# Patient Record
Sex: FEMALE | Race: WHITE | Employment: PART TIME | URBAN - METROPOLITAN AREA
[De-identification: names, ages, dates, MRNs, and addresses within clinical notes are randomized per-mention and may not be internally consistent; named-entity substitution may affect disease eponyms.]

---

## 2017-01-20 ENCOUNTER — TELEPHONE (OUTPATIENT)
Dept: FAMILY MEDICINE CLINIC | Facility: CLINIC | Age: 22
End: 2017-01-20

## 2017-01-20 DIAGNOSIS — E10.9 TYPE 1 DIABETES MELLITUS WITHOUT COMPLICATION (HCC): Primary | ICD-10-CM

## 2017-02-07 ENCOUNTER — TELEPHONE (OUTPATIENT)
Dept: FAMILY MEDICINE CLINIC | Facility: CLINIC | Age: 22
End: 2017-02-07

## 2017-02-07 DIAGNOSIS — Z11.1 SCREENING-PULMONARY TB: Primary | ICD-10-CM

## 2017-02-07 NOTE — TELEPHONE ENCOUNTER
Patient states she needs a TB blood test for school. Has nurse visit scheduled Friday.  Patient aware office will call her only if further questions     Routed to Dr Charlie Little to order if ok

## 2017-02-12 ENCOUNTER — HOSPITAL ENCOUNTER (OUTPATIENT)
Age: 22
Discharge: HOME | End: 2017-02-12
Attending: EMERGENCY MEDICINE
Payer: COMMERCIAL

## 2017-02-12 VITALS
OXYGEN SATURATION: 97 % | SYSTOLIC BLOOD PRESSURE: 106 MMHG | HEART RATE: 66 BPM | BODY MASS INDEX: 27.46 KG/M2 | HEIGHT: 63 IN | TEMPERATURE: 98 F | RESPIRATION RATE: 18 BRPM | WEIGHT: 155 LBS | DIASTOLIC BLOOD PRESSURE: 73 MMHG

## 2017-02-12 DIAGNOSIS — J40 BRONCHITIS: Primary | ICD-10-CM

## 2017-02-12 PROCEDURE — 99204 OFFICE O/P NEW MOD 45 MIN: CPT

## 2017-02-12 PROCEDURE — 99213 OFFICE O/P EST LOW 20 MIN: CPT

## 2017-02-12 RX ORDER — BENZONATATE 200 MG/1
200 CAPSULE ORAL 3 TIMES DAILY PRN
Qty: 30 CAPSULE | Refills: 0 | Status: SHIPPED | OUTPATIENT
Start: 2017-02-12 | End: 2017-03-14

## 2017-02-12 RX ORDER — AZITHROMYCIN 250 MG/1
TABLET, FILM COATED ORAL
Qty: 1 PACKAGE | Refills: 0 | Status: SHIPPED | OUTPATIENT
Start: 2017-02-12 | End: 2017-02-17

## 2017-02-12 NOTE — ED PROVIDER NOTES
Patient presents with:  Cough/URI    HPI:     Kate Mckeon is a 20year old female who presents with chief complaint of cough, congestion. Stared 2 weeks ago. 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 \"  She was taking muc discussed with patient. See AVS for detailed discharge instructions.

## 2017-02-12 NOTE — ED INITIAL ASSESSMENT (HPI)
Pt with c/o uri sx. Pt states cough and congestion for past 2 weeks. Pt c/o sore throat, resolved today. Pt was taking mucinex d.   Denies fevers

## 2017-02-17 ENCOUNTER — NURSE ONLY (OUTPATIENT)
Dept: FAMILY MEDICINE CLINIC | Facility: CLINIC | Age: 22
End: 2017-02-17

## 2017-02-17 DIAGNOSIS — Z01.84 IMMUNITY STATUS TESTING: Primary | ICD-10-CM

## 2017-02-17 PROCEDURE — 36415 COLL VENOUS BLD VENIPUNCTURE: CPT | Performed by: FAMILY MEDICINE

## 2017-02-17 PROCEDURE — 86480 TB TEST CELL IMMUN MEASURE: CPT | Performed by: FAMILY MEDICINE

## 2017-02-21 LAB
AMB EXT HGBA1C: 9.8 %
M TB TUBERC IFN-G BLD QL: NEGATIVE
M TB TUBERC IFN-G/MITOGEN IGNF BLD: 0.17 IU/ML
M TB TUBERC IGNF/MITOGEN IGNF CONTROL: >10 IU/ML
MITOGEN IGNF BCKGRD COR BLD-ACNC: 0.02 IU/ML

## 2017-02-22 ENCOUNTER — MED REC SCAN ONLY (OUTPATIENT)
Dept: FAMILY MEDICINE CLINIC | Facility: CLINIC | Age: 22
End: 2017-02-22

## 2017-05-24 RX ORDER — VALACYCLOVIR HYDROCHLORIDE 1 G/1
TABLET, FILM COATED ORAL
Qty: 24 TABLET | Refills: 1 | OUTPATIENT
Start: 2017-05-24

## 2017-06-07 RX ORDER — VALACYCLOVIR HYDROCHLORIDE 1 G/1
TABLET, FILM COATED ORAL
Qty: 24 TABLET | Refills: 0 | Status: SHIPPED | OUTPATIENT
Start: 2017-06-07

## 2017-07-05 ENCOUNTER — TELEPHONE (OUTPATIENT)
Dept: FAMILY MEDICINE CLINIC | Facility: CLINIC | Age: 22
End: 2017-07-05

## 2017-07-05 NOTE — TELEPHONE ENCOUNTER
Left message for patient to call. Need to find out when she last had her eye exam.  Did she follow up with Dr. Vann after the Feb appt.

## 2017-07-06 NOTE — TELEPHONE ENCOUNTER
Patient states that she had her eye exam on 3 30 2017 at [facility]- she states that she has requested for the results to be sent x 2- she wrote down the fax number and will request for the information to be sent again.  Patient states that she has not followed up

## 2017-07-25 ENCOUNTER — TELEPHONE (OUTPATIENT)
Dept: FAMILY MEDICINE CLINIC | Facility: CLINIC | Age: 22
End: 2017-07-25

## 2017-07-25 DIAGNOSIS — E10.9 TYPE 1 DIABETES MELLITUS WITHOUT COMPLICATION (HCC): Primary | ICD-10-CM

## 2017-08-14 ENCOUNTER — PATIENT OUTREACH (OUTPATIENT)
Dept: FAMILY MEDICINE CLINIC | Facility: CLINIC | Age: 22
End: 2017-08-14

## 2017-08-14 NOTE — PROGRESS NOTES
Left message for patient to call office back. Office phone number provided. Patient due for repeat A1c and eye exam.  See telephone message 7/5/17, states had eye exam done at [facility] 3/30/17.

## 2017-10-26 ENCOUNTER — MED REC SCAN ONLY (OUTPATIENT)
Dept: FAMILY MEDICINE CLINIC | Facility: CLINIC | Age: 22
End: 2017-10-26

## 2017-11-30 ENCOUNTER — TELEPHONE (OUTPATIENT)
Dept: FAMILY MEDICINE CLINIC | Facility: CLINIC | Age: 22
End: 2017-11-30

## 2018-09-27 ENCOUNTER — PATIENT OUTREACH (OUTPATIENT)
Dept: FAMILY MEDICINE CLINIC | Facility: CLINIC | Age: 23
End: 2018-09-27

## 2020-12-04 DIAGNOSIS — Z01.84 ANTIBODY RESPONSE EXAMINATION: ICD-10-CM

## 2021-02-22 ENCOUNTER — OFFICE VISIT (OUTPATIENT)
Dept: FAMILY MEDICINE | Facility: CLINIC | Age: 26
End: 2021-02-22
Payer: COMMERCIAL

## 2021-02-22 VITALS
OXYGEN SATURATION: 96 % | TEMPERATURE: 98 F | WEIGHT: 167.75 LBS | DIASTOLIC BLOOD PRESSURE: 88 MMHG | SYSTOLIC BLOOD PRESSURE: 124 MMHG | BODY MASS INDEX: 28.64 KG/M2 | HEIGHT: 64 IN | HEART RATE: 84 BPM

## 2021-02-22 DIAGNOSIS — Z78.9 USES BIRTH CONTROL: ICD-10-CM

## 2021-02-22 DIAGNOSIS — E10.9: Primary | ICD-10-CM

## 2021-02-22 DIAGNOSIS — Z00.00 ANNUAL PHYSICAL EXAM: ICD-10-CM

## 2021-02-22 DIAGNOSIS — Z86.19 H/O COLD SORES: ICD-10-CM

## 2021-02-22 PROCEDURE — 3008F PR BODY MASS INDEX (BMI) DOCUMENTED: ICD-10-PCS | Mod: CPTII,S$GLB,, | Performed by: INTERNAL MEDICINE

## 2021-02-22 PROCEDURE — 99204 OFFICE O/P NEW MOD 45 MIN: CPT | Mod: S$GLB,,, | Performed by: INTERNAL MEDICINE

## 2021-02-22 PROCEDURE — 1126F AMNT PAIN NOTED NONE PRSNT: CPT | Mod: S$GLB,,, | Performed by: INTERNAL MEDICINE

## 2021-02-22 PROCEDURE — 1126F PR PAIN SEVERITY QUANTIFIED, NO PAIN PRESENT: ICD-10-PCS | Mod: S$GLB,,, | Performed by: INTERNAL MEDICINE

## 2021-02-22 PROCEDURE — 99999 PR PBB SHADOW E&M-EST. PATIENT-LVL IV: CPT | Mod: PBBFAC,,, | Performed by: INTERNAL MEDICINE

## 2021-02-22 PROCEDURE — 3008F BODY MASS INDEX DOCD: CPT | Mod: CPTII,S$GLB,, | Performed by: INTERNAL MEDICINE

## 2021-02-22 PROCEDURE — 99204 PR OFFICE/OUTPT VISIT, NEW, LEVL IV, 45-59 MIN: ICD-10-PCS | Mod: S$GLB,,, | Performed by: INTERNAL MEDICINE

## 2021-02-22 PROCEDURE — 99999 PR PBB SHADOW E&M-EST. PATIENT-LVL IV: ICD-10-PCS | Mod: PBBFAC,,, | Performed by: INTERNAL MEDICINE

## 2021-02-22 RX ORDER — INSULIN ASPART 100 [IU]/ML
INJECTION, SOLUTION INTRAVENOUS; SUBCUTANEOUS
Qty: 40 ML | Refills: 11 | Status: SHIPPED | OUTPATIENT
Start: 2021-02-22 | End: 2022-03-04 | Stop reason: SDUPTHER

## 2021-02-22 RX ORDER — NORGESTIMATE AND ETHINYL ESTRADIOL 7DAYSX3 28
KIT ORAL
Qty: 28 TABLET | Refills: 11 | Status: SHIPPED | OUTPATIENT
Start: 2021-02-22

## 2021-02-22 RX ORDER — INSULIN ASPART 100 [IU]/ML
100 INJECTION, SOLUTION INTRAVENOUS; SUBCUTANEOUS DAILY
COMMUNITY

## 2021-02-22 RX ORDER — VALACYCLOVIR HYDROCHLORIDE 1 G/1
1000 TABLET, FILM COATED ORAL 2 TIMES DAILY
COMMUNITY
End: 2021-02-22 | Stop reason: SDUPTHER

## 2021-02-22 RX ORDER — VALACYCLOVIR HYDROCHLORIDE 1 G/1
1000 TABLET, FILM COATED ORAL 2 TIMES DAILY
Qty: 60 TABLET | Refills: 1 | Status: SHIPPED | OUTPATIENT
Start: 2021-02-22 | End: 2021-12-16 | Stop reason: SDUPTHER

## 2021-04-17 ENCOUNTER — IMMUNIZATION (OUTPATIENT)
Dept: PRIMARY CARE CLINIC | Facility: CLINIC | Age: 26
End: 2021-04-17
Payer: COMMERCIAL

## 2021-04-17 DIAGNOSIS — Z23 NEED FOR VACCINATION: Primary | ICD-10-CM

## 2021-04-17 PROCEDURE — 91301 COVID-19, MRNA, LNP-S, PF, 100 MCG/0.5 ML DOSE VACCINE: ICD-10-PCS | Mod: S$GLB,,, | Performed by: INTERNAL MEDICINE

## 2021-04-17 PROCEDURE — 0011A COVID-19, MRNA, LNP-S, PF, 100 MCG/0.5 ML DOSE VACCINE: ICD-10-PCS | Mod: CV19,S$GLB,, | Performed by: INTERNAL MEDICINE

## 2021-04-17 PROCEDURE — 0011A COVID-19, MRNA, LNP-S, PF, 100 MCG/0.5 ML DOSE VACCINE: CPT | Mod: CV19,S$GLB,, | Performed by: INTERNAL MEDICINE

## 2021-04-17 PROCEDURE — 91301 COVID-19, MRNA, LNP-S, PF, 100 MCG/0.5 ML DOSE VACCINE: CPT | Mod: S$GLB,,, | Performed by: INTERNAL MEDICINE

## 2021-04-19 ENCOUNTER — TELEPHONE (OUTPATIENT)
Dept: FAMILY MEDICINE | Facility: CLINIC | Age: 26
End: 2021-04-19

## 2021-05-17 ENCOUNTER — IMMUNIZATION (OUTPATIENT)
Dept: INTERNAL MEDICINE | Facility: CLINIC | Age: 26
End: 2021-05-17
Payer: COMMERCIAL

## 2021-05-17 DIAGNOSIS — Z23 NEED FOR VACCINATION: Primary | ICD-10-CM

## 2021-05-17 PROCEDURE — 0012A COVID-19, MRNA, LNP-S, PF, 100 MCG/0.5 ML DOSE VACCINE: CPT | Mod: PBBFAC | Performed by: INTERNAL MEDICINE

## 2021-07-06 ENCOUNTER — PATIENT MESSAGE (OUTPATIENT)
Dept: ADMINISTRATIVE | Facility: HOSPITAL | Age: 26
End: 2021-07-06

## 2021-08-20 ENCOUNTER — PATIENT MESSAGE (OUTPATIENT)
Dept: FAMILY MEDICINE | Facility: CLINIC | Age: 26
End: 2021-08-20

## 2021-10-08 ENCOUNTER — OFFICE VISIT (OUTPATIENT)
Dept: OPTOMETRY | Facility: CLINIC | Age: 26
End: 2021-10-08
Payer: COMMERCIAL

## 2021-10-08 DIAGNOSIS — E10.9 TYPE 1 DIABETES MELLITUS WITHOUT RETINOPATHY: Primary | ICD-10-CM

## 2021-10-08 DIAGNOSIS — H52.203 MYOPIA OF BOTH EYES WITH ASTIGMATISM: ICD-10-CM

## 2021-10-08 DIAGNOSIS — Z46.0 FITTING AND ADJUSTMENT OF SPECTACLES AND CONTACT LENSES: Primary | ICD-10-CM

## 2021-10-08 DIAGNOSIS — H04.123 DRY EYE SYNDROME OF BOTH EYES: ICD-10-CM

## 2021-10-08 DIAGNOSIS — H18.823 CONTACT LENS OVERWEAR OF BOTH EYES: ICD-10-CM

## 2021-10-08 DIAGNOSIS — H52.13 MYOPIA OF BOTH EYES WITH ASTIGMATISM: ICD-10-CM

## 2021-10-08 PROCEDURE — 92015 PR REFRACTION: ICD-10-PCS | Mod: S$GLB,,, | Performed by: OPTOMETRIST

## 2021-10-08 PROCEDURE — 92310 PR CONTACT LENS FITTING (NO CHANGE): ICD-10-PCS | Mod: S$GLB,,, | Performed by: OPTOMETRIST

## 2021-10-08 PROCEDURE — 99999 PR PBB SHADOW E&M-EST. PATIENT-LVL I: CPT | Mod: PBBFAC,,, | Performed by: OPTOMETRIST

## 2021-10-08 PROCEDURE — 92004 PR EYE EXAM, NEW PATIENT,COMPREHESV: ICD-10-PCS | Mod: S$GLB,,, | Performed by: OPTOMETRIST

## 2021-10-08 PROCEDURE — 99999 PR PBB SHADOW E&M-EST. PATIENT-LVL I: ICD-10-PCS | Mod: PBBFAC,,, | Performed by: OPTOMETRIST

## 2021-10-08 PROCEDURE — 92310 CONTACT LENS FITTING OU: CPT | Mod: S$GLB,,, | Performed by: OPTOMETRIST

## 2021-10-08 PROCEDURE — 99999 PR PBB SHADOW E&M-EST. PATIENT-LVL II: CPT | Mod: PBBFAC,,, | Performed by: OPTOMETRIST

## 2021-10-08 PROCEDURE — 99999 PR PBB SHADOW E&M-EST. PATIENT-LVL II: ICD-10-PCS | Mod: PBBFAC,,, | Performed by: OPTOMETRIST

## 2021-10-08 PROCEDURE — 92004 COMPRE OPH EXAM NEW PT 1/>: CPT | Mod: S$GLB,,, | Performed by: OPTOMETRIST

## 2021-10-08 PROCEDURE — 92015 DETERMINE REFRACTIVE STATE: CPT | Mod: S$GLB,,, | Performed by: OPTOMETRIST

## 2021-10-08 RX ORDER — NEOMYCIN SULFATE, POLYMYXIN B SULFATE AND DEXAMETHASONE 3.5; 10000; 1 MG/ML; [USP'U]/ML; MG/ML
1 SUSPENSION/ DROPS OPHTHALMIC 4 TIMES DAILY
Qty: 5 ML | Refills: 0 | Status: SHIPPED | OUTPATIENT
Start: 2021-10-08 | End: 2021-10-28

## 2021-10-25 ENCOUNTER — PATIENT MESSAGE (OUTPATIENT)
Dept: ADMINISTRATIVE | Facility: HOSPITAL | Age: 26
End: 2021-10-25
Payer: COMMERCIAL

## 2021-10-29 ENCOUNTER — OFFICE VISIT (OUTPATIENT)
Dept: OPTOMETRY | Facility: CLINIC | Age: 26
End: 2021-10-29
Payer: COMMERCIAL

## 2021-10-29 DIAGNOSIS — H52.13 MYOPIA OF BOTH EYES WITH ASTIGMATISM: Primary | ICD-10-CM

## 2021-10-29 DIAGNOSIS — H52.203 MYOPIA OF BOTH EYES WITH ASTIGMATISM: Primary | ICD-10-CM

## 2021-10-29 PROCEDURE — 92499 PR CONTACT LENS F/U LEV 1: ICD-10-PCS | Mod: S$GLB,,, | Performed by: OPTOMETRIST

## 2021-10-29 PROCEDURE — 99999 PR PBB SHADOW E&M-EST. PATIENT-LVL II: ICD-10-PCS | Mod: PBBFAC,,, | Performed by: OPTOMETRIST

## 2021-10-29 PROCEDURE — 1160F PR REVIEW ALL MEDS BY PRESCRIBER/CLIN PHARMACIST DOCUMENTED: ICD-10-PCS | Mod: CPTII,S$GLB,, | Performed by: OPTOMETRIST

## 2021-10-29 PROCEDURE — 1159F PR MEDICATION LIST DOCUMENTED IN MEDICAL RECORD: ICD-10-PCS | Mod: CPTII,S$GLB,, | Performed by: OPTOMETRIST

## 2021-10-29 PROCEDURE — 92499 UNLISTED OPH SVC/PROCEDURE: CPT | Mod: S$GLB,,, | Performed by: OPTOMETRIST

## 2021-10-29 PROCEDURE — 99999 PR PBB SHADOW E&M-EST. PATIENT-LVL II: CPT | Mod: PBBFAC,,, | Performed by: OPTOMETRIST

## 2021-10-29 PROCEDURE — 1159F MED LIST DOCD IN RCRD: CPT | Mod: CPTII,S$GLB,, | Performed by: OPTOMETRIST

## 2021-10-29 PROCEDURE — 1160F RVW MEDS BY RX/DR IN RCRD: CPT | Mod: CPTII,S$GLB,, | Performed by: OPTOMETRIST

## 2021-12-16 DIAGNOSIS — Z86.19 H/O COLD SORES: ICD-10-CM

## 2021-12-16 DIAGNOSIS — Z00.00 ANNUAL PHYSICAL EXAM: ICD-10-CM

## 2021-12-16 RX ORDER — VALACYCLOVIR HYDROCHLORIDE 1 G/1
1000 TABLET, FILM COATED ORAL 2 TIMES DAILY
Qty: 60 TABLET | Refills: 1 | Status: ON HOLD | OUTPATIENT
Start: 2021-12-16 | End: 2022-02-08 | Stop reason: HOSPADM

## 2022-01-10 ENCOUNTER — PATIENT MESSAGE (OUTPATIENT)
Dept: ADMINISTRATIVE | Facility: HOSPITAL | Age: 27
End: 2022-01-10
Payer: COMMERCIAL

## 2022-01-20 DIAGNOSIS — E11.9 TYPE 2 DIABETES MELLITUS WITHOUT COMPLICATION: ICD-10-CM

## 2022-02-06 ENCOUNTER — HOSPITAL ENCOUNTER (INPATIENT)
Facility: HOSPITAL | Age: 27
LOS: 2 days | Discharge: HOME | DRG: 637 | End: 2022-02-08
Attending: EMERGENCY MEDICINE | Admitting: INTERNAL MEDICINE
Payer: COMMERCIAL

## 2022-02-06 DIAGNOSIS — R00.0 TACHYCARDIA: ICD-10-CM

## 2022-02-06 DIAGNOSIS — E10.10 DIABETIC KETOACIDOSIS WITHOUT COMA ASSOCIATED WITH TYPE 1 DIABETES MELLITUS: ICD-10-CM

## 2022-02-06 DIAGNOSIS — E11.10 DKA (DIABETIC KETOACIDOSIS): ICD-10-CM

## 2022-02-06 DIAGNOSIS — R73.9 HYPERGLYCEMIA: ICD-10-CM

## 2022-02-06 PROBLEM — J12.82 PNEUMONIA DUE TO COVID-19 VIRUS: Status: ACTIVE | Noted: 2022-02-06

## 2022-02-06 PROBLEM — E87.20 LACTIC ACIDOSIS: Status: ACTIVE | Noted: 2022-02-06

## 2022-02-06 PROBLEM — U07.1 COVID: Status: ACTIVE | Noted: 2022-02-06

## 2022-02-06 PROBLEM — N17.9 AKI (ACUTE KIDNEY INJURY): Status: ACTIVE | Noted: 2022-02-06

## 2022-02-06 LAB
ALBUMIN SERPL BCP-MCNC: 4.6 G/DL (ref 3.5–5.2)
ALLENS TEST: ABNORMAL
ALP SERPL-CCNC: 153 U/L (ref 55–135)
ALT SERPL W/O P-5'-P-CCNC: 19 U/L (ref 10–44)
AMPHET+METHAMPHET UR QL: NEGATIVE
ANION GAP SERPL CALC-SCNC: 17 MMOL/L (ref 8–16)
ANION GAP SERPL CALC-SCNC: 25 MMOL/L (ref 8–16)
ANION GAP SERPL CALC-SCNC: ABNORMAL MMOL/L (ref 8–16)
AST SERPL-CCNC: 21 U/L (ref 10–40)
B-HCG UR QL: NEGATIVE
B-OH-BUTYR BLD STRIP-SCNC: 4.8 MMOL/L (ref 0–0.5)
BACTERIA #/AREA URNS AUTO: ABNORMAL /HPF
BARBITURATES UR QL SCN>200 NG/ML: NEGATIVE
BASOPHILS # BLD AUTO: 0.32 K/UL (ref 0–0.2)
BASOPHILS NFR BLD: 0.8 % (ref 0–1.9)
BENZODIAZ UR QL SCN>200 NG/ML: NEGATIVE
BILIRUB SERPL-MCNC: 1.1 MG/DL (ref 0.1–1)
BILIRUB UR QL STRIP: NEGATIVE
BUN SERPL-MCNC: 19 MG/DL (ref 6–20)
BUN SERPL-MCNC: 28 MG/DL (ref 6–20)
BUN SERPL-MCNC: 29 MG/DL (ref 6–20)
BZE UR QL SCN: NEGATIVE
CALCIUM SERPL-MCNC: 10 MG/DL (ref 8.7–10.5)
CALCIUM SERPL-MCNC: 9.7 MG/DL (ref 8.7–10.5)
CALCIUM SERPL-MCNC: 9.8 MG/DL (ref 8.7–10.5)
CANNABINOIDS UR QL SCN: NEGATIVE
CHLORIDE SERPL-SCNC: 104 MMOL/L (ref 95–110)
CHLORIDE SERPL-SCNC: 93 MMOL/L (ref 95–110)
CHLORIDE SERPL-SCNC: 98 MMOL/L (ref 95–110)
CLARITY UR REFRACT.AUTO: ABNORMAL
CO2 SERPL-SCNC: 13 MMOL/L (ref 23–29)
CO2 SERPL-SCNC: 5 MMOL/L (ref 23–29)
CO2 SERPL-SCNC: <5 MMOL/L (ref 23–29)
COLOR UR AUTO: YELLOW
CREAT SERPL-MCNC: 1.3 MG/DL (ref 0.5–1.4)
CREAT SERPL-MCNC: 1.9 MG/DL (ref 0.5–1.4)
CREAT SERPL-MCNC: 2.1 MG/DL (ref 0.5–1.4)
CREAT UR-MCNC: 27 MG/DL (ref 15–325)
CRP SERPL-MCNC: 6.4 MG/L (ref 0–8.2)
CTP QC/QA: YES
CTP QC/QA: YES
DELSYS: ABNORMAL
DELSYS: ABNORMAL
DIFFERENTIAL METHOD: ABNORMAL
EOSINOPHIL # BLD AUTO: 0 K/UL (ref 0–0.5)
EOSINOPHIL NFR BLD: 0.1 % (ref 0–8)
ERYTHROCYTE [DISTWIDTH] IN BLOOD BY AUTOMATED COUNT: 14 % (ref 11.5–14.5)
EST. GFR  (AFRICAN AMERICAN): 36.1 ML/MIN/1.73 M^2
EST. GFR  (AFRICAN AMERICAN): 40.8 ML/MIN/1.73 M^2
EST. GFR  (AFRICAN AMERICAN): >60 ML/MIN/1.73 M^2
EST. GFR  (NON AFRICAN AMERICAN): 31.3 ML/MIN/1.73 M^2
EST. GFR  (NON AFRICAN AMERICAN): 35.4 ML/MIN/1.73 M^2
EST. GFR  (NON AFRICAN AMERICAN): 56 ML/MIN/1.73 M^2
ESTIMATED AVG GLUCOSE: 298 MG/DL (ref 68–131)
ETHANOL UR-MCNC: <10 MG/DL
FERRITIN SERPL-MCNC: 102 NG/ML (ref 20–300)
GLUCOSE SERPL-MCNC: 172 MG/DL (ref 70–110)
GLUCOSE SERPL-MCNC: 524 MG/DL (ref 70–110)
GLUCOSE SERPL-MCNC: 873 MG/DL (ref 70–110)
GLUCOSE UR QL STRIP: ABNORMAL
GRAN CASTS UR QL COMP ASSIST: 3 /LPF
HBA1C MFR BLD: 12 % (ref 4–5.6)
HCO3 UR-SCNC: 18.7 MMOL/L (ref 24–28)
HCO3 UR-SCNC: 4.6 MMOL/L (ref 24–28)
HCT VFR BLD AUTO: 52.4 % (ref 37–48.5)
HCT VFR BLD CALC: 30 %PCV (ref 36–54)
HCT VFR BLD CALC: 49 %PCV (ref 36–54)
HGB BLD-MCNC: 15.8 G/DL (ref 12–16)
HGB UR QL STRIP: ABNORMAL
HYALINE CASTS UR QL AUTO: 4 /LPF
IMM GRANULOCYTES # BLD AUTO: 1.38 K/UL (ref 0–0.04)
IMM GRANULOCYTES NFR BLD AUTO: 3.5 % (ref 0–0.5)
KETONES UR QL STRIP: ABNORMAL
LACTATE SERPL-SCNC: 5.4 MMOL/L (ref 0.5–2.2)
LDH SERPL L TO P-CCNC: 361 U/L (ref 110–260)
LDH SERPL L TO P-CCNC: 6.08 MMOL/L (ref 0.5–2.2)
LEUKOCYTE ESTERASE UR QL STRIP: NEGATIVE
LIPASE SERPL-CCNC: 32 U/L (ref 4–60)
LYMPHOCYTES # BLD AUTO: 2 K/UL (ref 1–4.8)
LYMPHOCYTES NFR BLD: 5 % (ref 18–48)
MCH RBC QN AUTO: 28.3 PG (ref 27–31)
MCHC RBC AUTO-ENTMCNC: 30.2 G/DL (ref 32–36)
MCV RBC AUTO: 94 FL (ref 82–98)
METHADONE UR QL SCN>300 NG/ML: NEGATIVE
MICROSCOPIC COMMENT: ABNORMAL
MODE: ABNORMAL
MONOCYTES # BLD AUTO: 2.2 K/UL (ref 0.3–1)
MONOCYTES NFR BLD: 5.5 % (ref 4–15)
NEUTROPHILS # BLD AUTO: 33.9 K/UL (ref 1.8–7.7)
NEUTROPHILS NFR BLD: 85.1 % (ref 38–73)
NITRITE UR QL STRIP: NEGATIVE
NRBC BLD-RTO: 0 /100 WBC
OPIATES UR QL SCN: ABNORMAL
PCO2 BLDA: 22 MMHG (ref 35–45)
PCO2 BLDA: 36.6 MMHG (ref 35–45)
PCP UR QL SCN>25 NG/ML: NEGATIVE
PH SMN: 6.93 [PH] (ref 7.35–7.45)
PH SMN: 7.32 [PH] (ref 7.35–7.45)
PH UR STRIP: 6 [PH] (ref 5–8)
PLATELET # BLD AUTO: 533 K/UL (ref 150–450)
PMV BLD AUTO: 12.8 FL (ref 9.2–12.9)
PO2 BLDA: 132 MMHG (ref 40–60)
PO2 BLDA: 57 MMHG (ref 40–60)
POC BE: -28 MMOL/L
POC BE: -7 MMOL/L
POC IONIZED CALCIUM: 1.17 MMOL/L (ref 1.06–1.42)
POC IONIZED CALCIUM: 1.26 MMOL/L (ref 1.06–1.42)
POC SATURATED O2: 69 % (ref 95–100)
POC SATURATED O2: 99 % (ref 95–100)
POC TCO2: 20 MMOL/L (ref 24–29)
POC TCO2: 5 MMOL/L (ref 24–29)
POCT GLUCOSE: 111 MG/DL (ref 70–110)
POCT GLUCOSE: 138 MG/DL (ref 70–110)
POCT GLUCOSE: 151 MG/DL (ref 70–110)
POCT GLUCOSE: 199 MG/DL (ref 70–110)
POCT GLUCOSE: 211 MG/DL (ref 70–110)
POCT GLUCOSE: 227 MG/DL (ref 70–110)
POCT GLUCOSE: 255 MG/DL (ref 70–110)
POCT GLUCOSE: 263 MG/DL (ref 70–110)
POCT GLUCOSE: 311 MG/DL (ref 70–110)
POCT GLUCOSE: 314 MG/DL (ref 70–110)
POCT GLUCOSE: >500 MG/DL (ref 70–110)
POTASSIUM BLD-SCNC: 5.2 MMOL/L (ref 3.5–5.1)
POTASSIUM BLD-SCNC: 7.3 MMOL/L (ref 3.5–5.1)
POTASSIUM SERPL-SCNC: 4.2 MMOL/L (ref 3.5–5.1)
POTASSIUM SERPL-SCNC: 5 MMOL/L (ref 3.5–5.1)
POTASSIUM SERPL-SCNC: 7.2 MMOL/L (ref 3.5–5.1)
PROCALCITONIN SERPL IA-MCNC: 2.57 NG/ML
PROT SERPL-MCNC: 8.7 G/DL (ref 6–8.4)
PROT UR QL STRIP: ABNORMAL
RBC # BLD AUTO: 5.59 M/UL (ref 4–5.4)
RBC #/AREA URNS AUTO: 1 /HPF (ref 0–4)
SAMPLE: ABNORMAL
SARS-COV-2 RDRP RESP QL NAA+PROBE: POSITIVE
SITE: ABNORMAL
SODIUM BLD-SCNC: 125 MMOL/L (ref 136–145)
SODIUM BLD-SCNC: 133 MMOL/L (ref 136–145)
SODIUM SERPL-SCNC: 127 MMOL/L (ref 136–145)
SODIUM SERPL-SCNC: 128 MMOL/L (ref 136–145)
SODIUM SERPL-SCNC: 134 MMOL/L (ref 136–145)
SP GR UR STRIP: 1.02 (ref 1–1.03)
SQUAMOUS #/AREA URNS AUTO: 1 /HPF
TOXICOLOGY INFORMATION: ABNORMAL
TROPONIN I SERPL DL<=0.01 NG/ML-MCNC: 0.01 NG/ML (ref 0–0.03)
URN SPEC COLLECT METH UR: ABNORMAL
WBC # BLD AUTO: 39.84 K/UL (ref 3.9–12.7)
WBC #/AREA URNS AUTO: 4 /HPF (ref 0–5)
YEAST UR QL AUTO: ABNORMAL

## 2022-02-06 PROCEDURE — 63600175 PHARM REV CODE 636 W HCPCS: Performed by: EMERGENCY MEDICINE

## 2022-02-06 PROCEDURE — 85014 HEMATOCRIT: CPT

## 2022-02-06 PROCEDURE — 99291 CRITICAL CARE FIRST HOUR: CPT | Mod: 25

## 2022-02-06 PROCEDURE — 83605 ASSAY OF LACTIC ACID: CPT | Performed by: EMERGENCY MEDICINE

## 2022-02-06 PROCEDURE — 25000003 PHARM REV CODE 250: Performed by: INTERNAL MEDICINE

## 2022-02-06 PROCEDURE — 93005 ELECTROCARDIOGRAM TRACING: CPT

## 2022-02-06 PROCEDURE — 83690 ASSAY OF LIPASE: CPT | Performed by: EMERGENCY MEDICINE

## 2022-02-06 PROCEDURE — U0002 COVID-19 LAB TEST NON-CDC: HCPCS | Performed by: EMERGENCY MEDICINE

## 2022-02-06 PROCEDURE — 85025 COMPLETE CBC W/AUTO DIFF WBC: CPT | Performed by: EMERGENCY MEDICINE

## 2022-02-06 PROCEDURE — S5010 5% DEXTROSE AND 0.45% SALINE: HCPCS

## 2022-02-06 PROCEDURE — 93010 EKG 12-LEAD: ICD-10-PCS | Mod: ,,, | Performed by: INTERNAL MEDICINE

## 2022-02-06 PROCEDURE — 82330 ASSAY OF CALCIUM: CPT

## 2022-02-06 PROCEDURE — 80053 COMPREHEN METABOLIC PANEL: CPT | Performed by: EMERGENCY MEDICINE

## 2022-02-06 PROCEDURE — 36415 COLL VENOUS BLD VENIPUNCTURE: CPT | Performed by: NURSE PRACTITIONER

## 2022-02-06 PROCEDURE — 99291 PR CRITICAL CARE, E/M 30-74 MINUTES: ICD-10-PCS | Mod: ,,, | Performed by: EMERGENCY MEDICINE

## 2022-02-06 PROCEDURE — 99900035 HC TECH TIME PER 15 MIN (STAT)

## 2022-02-06 PROCEDURE — 81001 URINALYSIS AUTO W/SCOPE: CPT | Performed by: EMERGENCY MEDICINE

## 2022-02-06 PROCEDURE — 80048 BASIC METABOLIC PNL TOTAL CA: CPT | Performed by: NURSE PRACTITIONER

## 2022-02-06 PROCEDURE — 80047 BASIC METABLC PNL IONIZED CA: CPT | Mod: 91

## 2022-02-06 PROCEDURE — 99291 PR CRITICAL CARE, E/M 30-74 MINUTES: ICD-10-PCS | Mod: ,,, | Performed by: INTERNAL MEDICINE

## 2022-02-06 PROCEDURE — 87040 BLOOD CULTURE FOR BACTERIA: CPT | Performed by: EMERGENCY MEDICINE

## 2022-02-06 PROCEDURE — 84145 PROCALCITONIN (PCT): CPT | Performed by: STUDENT IN AN ORGANIZED HEALTH CARE EDUCATION/TRAINING PROGRAM

## 2022-02-06 PROCEDURE — 80307 DRUG TEST PRSMV CHEM ANLYZR: CPT | Performed by: STUDENT IN AN ORGANIZED HEALTH CARE EDUCATION/TRAINING PROGRAM

## 2022-02-06 PROCEDURE — 83036 HEMOGLOBIN GLYCOSYLATED A1C: CPT | Performed by: STUDENT IN AN ORGANIZED HEALTH CARE EDUCATION/TRAINING PROGRAM

## 2022-02-06 PROCEDURE — 82565 ASSAY OF CREATININE: CPT

## 2022-02-06 PROCEDURE — 63600175 PHARM REV CODE 636 W HCPCS: Performed by: STUDENT IN AN ORGANIZED HEALTH CARE EDUCATION/TRAINING PROGRAM

## 2022-02-06 PROCEDURE — 63600175 PHARM REV CODE 636 W HCPCS: Performed by: NURSE PRACTITIONER

## 2022-02-06 PROCEDURE — 82803 BLOOD GASES ANY COMBINATION: CPT

## 2022-02-06 PROCEDURE — 27000207 HC ISOLATION

## 2022-02-06 PROCEDURE — 81025 URINE PREGNANCY TEST: CPT | Performed by: EMERGENCY MEDICINE

## 2022-02-06 PROCEDURE — 84132 ASSAY OF SERUM POTASSIUM: CPT

## 2022-02-06 PROCEDURE — 20000000 HC ICU ROOM

## 2022-02-06 PROCEDURE — 86140 C-REACTIVE PROTEIN: CPT | Performed by: STUDENT IN AN ORGANIZED HEALTH CARE EDUCATION/TRAINING PROGRAM

## 2022-02-06 PROCEDURE — 83605 ASSAY OF LACTIC ACID: CPT

## 2022-02-06 PROCEDURE — 63600175 PHARM REV CODE 636 W HCPCS: Performed by: INTERNAL MEDICINE

## 2022-02-06 PROCEDURE — 25000003 PHARM REV CODE 250: Performed by: NURSE PRACTITIONER

## 2022-02-06 PROCEDURE — 80048 BASIC METABOLIC PNL TOTAL CA: CPT | Mod: 91 | Performed by: INTERNAL MEDICINE

## 2022-02-06 PROCEDURE — 96361 HYDRATE IV INFUSION ADD-ON: CPT

## 2022-02-06 PROCEDURE — 96365 THER/PROPH/DIAG IV INF INIT: CPT

## 2022-02-06 PROCEDURE — 93010 ELECTROCARDIOGRAM REPORT: CPT | Mod: ,,, | Performed by: INTERNAL MEDICINE

## 2022-02-06 PROCEDURE — 84295 ASSAY OF SERUM SODIUM: CPT

## 2022-02-06 PROCEDURE — 99223 PR INITIAL HOSPITAL CARE,LEVL III: ICD-10-PCS | Mod: ,,, | Performed by: GENERAL ACUTE CARE HOSPITAL

## 2022-02-06 PROCEDURE — 99223 1ST HOSP IP/OBS HIGH 75: CPT | Mod: ,,, | Performed by: GENERAL ACUTE CARE HOSPITAL

## 2022-02-06 PROCEDURE — 84484 ASSAY OF TROPONIN QUANT: CPT | Performed by: STUDENT IN AN ORGANIZED HEALTH CARE EDUCATION/TRAINING PROGRAM

## 2022-02-06 PROCEDURE — 99291 CRITICAL CARE FIRST HOUR: CPT | Mod: ,,, | Performed by: INTERNAL MEDICINE

## 2022-02-06 PROCEDURE — 94761 N-INVAS EAR/PLS OXIMETRY MLT: CPT

## 2022-02-06 PROCEDURE — 99291 CRITICAL CARE FIRST HOUR: CPT | Mod: ,,, | Performed by: EMERGENCY MEDICINE

## 2022-02-06 PROCEDURE — 83615 LACTATE (LD) (LDH) ENZYME: CPT | Performed by: STUDENT IN AN ORGANIZED HEALTH CARE EDUCATION/TRAINING PROGRAM

## 2022-02-06 PROCEDURE — 96375 TX/PRO/DX INJ NEW DRUG ADDON: CPT

## 2022-02-06 PROCEDURE — 82010 KETONE BODYS QUAN: CPT | Performed by: EMERGENCY MEDICINE

## 2022-02-06 PROCEDURE — 25000003 PHARM REV CODE 250: Performed by: EMERGENCY MEDICINE

## 2022-02-06 PROCEDURE — 25000003 PHARM REV CODE 250

## 2022-02-06 PROCEDURE — 82728 ASSAY OF FERRITIN: CPT | Performed by: STUDENT IN AN ORGANIZED HEALTH CARE EDUCATION/TRAINING PROGRAM

## 2022-02-06 RX ORDER — ONDANSETRON 2 MG/ML
4 INJECTION INTRAMUSCULAR; INTRAVENOUS
Status: COMPLETED | OUTPATIENT
Start: 2022-02-06 | End: 2022-02-06

## 2022-02-06 RX ORDER — DEXTROSE MONOHYDRATE 100 MG/ML
INJECTION, SOLUTION INTRAVENOUS
Status: DISCONTINUED | OUTPATIENT
Start: 2022-02-06 | End: 2022-02-08 | Stop reason: HOSPADM

## 2022-02-06 RX ORDER — MORPHINE SULFATE 4 MG/ML
4 INJECTION, SOLUTION INTRAMUSCULAR; INTRAVENOUS
Status: COMPLETED | OUTPATIENT
Start: 2022-02-06 | End: 2022-02-06

## 2022-02-06 RX ORDER — SODIUM CHLORIDE 0.9 % (FLUSH) 0.9 %
10 SYRINGE (ML) INJECTION
Status: DISCONTINUED | OUTPATIENT
Start: 2022-02-06 | End: 2022-02-08

## 2022-02-06 RX ORDER — SODIUM CHLORIDE, SODIUM LACTATE, POTASSIUM CHLORIDE, CALCIUM CHLORIDE 600; 310; 30; 20 MG/100ML; MG/100ML; MG/100ML; MG/100ML
INJECTION, SOLUTION INTRAVENOUS CONTINUOUS
Status: DISCONTINUED | OUTPATIENT
Start: 2022-02-06 | End: 2022-02-06

## 2022-02-06 RX ORDER — SODIUM CHLORIDE 0.9 % (FLUSH) 0.9 %
10 SYRINGE (ML) INJECTION
Status: DISCONTINUED | OUTPATIENT
Start: 2022-02-06 | End: 2022-02-08 | Stop reason: HOSPADM

## 2022-02-06 RX ORDER — ONDANSETRON 2 MG/ML
4 INJECTION INTRAMUSCULAR; INTRAVENOUS EVERY 8 HOURS PRN
Status: DISCONTINUED | OUTPATIENT
Start: 2022-02-06 | End: 2022-02-08 | Stop reason: HOSPADM

## 2022-02-06 RX ORDER — LIDOCAINE 50 MG/G
1 PATCH TOPICAL
Status: DISCONTINUED | OUTPATIENT
Start: 2022-02-06 | End: 2022-02-08 | Stop reason: HOSPADM

## 2022-02-06 RX ORDER — HEPARIN SODIUM 5000 [USP'U]/ML
5000 INJECTION, SOLUTION INTRAVENOUS; SUBCUTANEOUS EVERY 8 HOURS
Status: DISCONTINUED | OUTPATIENT
Start: 2022-02-06 | End: 2022-02-07

## 2022-02-06 RX ORDER — SODIUM CHLORIDE 9 MG/ML
INJECTION, SOLUTION INTRAVENOUS CONTINUOUS
Status: DISCONTINUED | OUTPATIENT
Start: 2022-02-06 | End: 2022-02-06

## 2022-02-06 RX ORDER — DEXTROSE MONOHYDRATE AND SODIUM CHLORIDE 5; .45 G/100ML; G/100ML
INJECTION, SOLUTION INTRAVENOUS CONTINUOUS
Status: DISCONTINUED | OUTPATIENT
Start: 2022-02-06 | End: 2022-02-07

## 2022-02-06 RX ORDER — ACETAMINOPHEN 325 MG/1
650 TABLET ORAL EVERY 4 HOURS PRN
Status: DISCONTINUED | OUTPATIENT
Start: 2022-02-06 | End: 2022-02-08 | Stop reason: HOSPADM

## 2022-02-06 RX ADMIN — SODIUM CHLORIDE, SODIUM LACTATE, POTASSIUM CHLORIDE, AND CALCIUM CHLORIDE: .6; .31; .03; .02 INJECTION, SOLUTION INTRAVENOUS at 07:02

## 2022-02-06 RX ADMIN — ONDANSETRON 4 MG: 2 INJECTION INTRAMUSCULAR; INTRAVENOUS at 05:02

## 2022-02-06 RX ADMIN — ACETAMINOPHEN 650 MG: 325 TABLET ORAL at 04:02

## 2022-02-06 RX ADMIN — DEXTROSE MONOHYDRATE AND SODIUM CHLORIDE: 5; .45 INJECTION, SOLUTION INTRAVENOUS at 05:02

## 2022-02-06 RX ADMIN — INSULIN HUMAN 3 UNITS/HR: 1 INJECTION, SOLUTION INTRAVENOUS at 05:02

## 2022-02-06 RX ADMIN — INSULIN HUMAN 6 UNITS: 100 INJECTION, SOLUTION PARENTERAL at 05:02

## 2022-02-06 RX ADMIN — CALCIUM GLUCONATE 1 G: 98 INJECTION, SOLUTION INTRAVENOUS at 05:02

## 2022-02-06 RX ADMIN — PIPERACILLIN AND TAZOBACTAM 4.5 G: 4; .5 INJECTION, POWDER, LYOPHILIZED, FOR SOLUTION INTRAVENOUS; PARENTERAL at 01:02

## 2022-02-06 RX ADMIN — HEPARIN SODIUM 5000 UNITS: 5000 INJECTION INTRAVENOUS; SUBCUTANEOUS at 10:02

## 2022-02-06 RX ADMIN — ACETAMINOPHEN 650 MG: 325 TABLET ORAL at 10:02

## 2022-02-06 RX ADMIN — SODIUM CHLORIDE 1000 ML: 0.9 INJECTION, SOLUTION INTRAVENOUS at 03:02

## 2022-02-06 RX ADMIN — PIPERACILLIN AND TAZOBACTAM 4.5 G: 4; .5 INJECTION, POWDER, LYOPHILIZED, FOR SOLUTION INTRAVENOUS; PARENTERAL at 05:02

## 2022-02-06 RX ADMIN — VANCOMYCIN HYDROCHLORIDE 1250 MG: 1.25 INJECTION, POWDER, LYOPHILIZED, FOR SOLUTION INTRAVENOUS at 09:02

## 2022-02-06 RX ADMIN — MORPHINE SULFATE 4 MG: 4 INJECTION INTRAVENOUS at 04:02

## 2022-02-06 RX ADMIN — ONDANSETRON 4 MG: 2 INJECTION INTRAMUSCULAR; INTRAVENOUS at 03:02

## 2022-02-06 RX ADMIN — HEPARIN SODIUM 5000 UNITS: 5000 INJECTION INTRAVENOUS; SUBCUTANEOUS at 02:02

## 2022-02-06 NOTE — LETTER
February 8, 2022    Debra Duran  [address]  [place] [address]                [address]  [facility] [address]  Phone: [phone]  Fax: [phone] To whom it may concern:    This is to certify that Ms. Debra Duran is suitable to return to work on 2/12.    If you have any questions or concerns, please don't hesitate to call.    Sincerely,        Judith Parkinson MD

## 2022-02-06 NOTE — H&P
"[facility] Dept  Critical Care Medicine  History & Physical    Patient Name: Debra Duran  MRN: [id number]  Admission Date: 2/6/2022  Hospital Length of Stay: 0 days  Code Status: Full Code  Attending Physician: Emelia Newsome MD   Primary Care Provider: Melvi Savage MD   Principal Problem: DKA (diabetic ketoacidosis)    Subjective:     HPI:  Mr. Duran is 28 y/o with PMHx IDDMI diagnosed at age of 13 years on insuline pump Ha1c around 8. Who presented to ED for one day of nausea and vomiting. She reports started feeling nauseous yesterday afternoon when she was at work and felt weak. She went home has been feel nauseous and has been drinking Sprit and water. She have been vomiting and stared having b/l subcostal abdominal pain and flank pain. She reports feeling SOB and she thought she it is from " volume overload" because she has been drinking a lot of fluid to keep herself hydrated, however she never had any heart failure issues before. She fever, chills, cough, chest pain, diarrhea, skin rashes dysuria or urinary symptoms. 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 She reports good compliance with her insuline treatment and she has been on insuline pump since 2014. She never had a DKA before and never been admitted to the hospital for diabetes complications.     In ED, she was she looks ill and dry, she was tachypnic and tachycardic  but otherwise afebrile and sating well RA. Lab significant for leukocytosis WBC 39, BS >500, Hydroxybutyrate 4.8, Istat K 7.3, lactic acid 5.4, HCO3 <5. She also found with FRANKY Cr 2.1 and positive for COVID. VBG with PH 6.93, pCO2 22. EKG with sinus tach and elevated P wave. CXR with some atelectatic changes and some LLL opacity, but obvious consolidation. She was given a bolus of IV insuline and started on gtt and IVF with LR. She was give calcium gluconate. Pt will be admitted to MICU for management of sever DKA. "           Hospital/ICU Course:  No notes on file     Past Medical History:   Diagnosis Date    Diabetes mellitus type 1     H/O cold sores     Heart murmur        No past surgical history on file.    Review of patient's allergies indicates:  No Known Allergies    Family History     Problem Relation (Age of Onset)    Alzheimer's disease Paternal Grandmother, Paternal Grandfather    Breast cancer Maternal Grandmother    Hyperlipidemia Father    Hypertension Father    No Known Problems Mother, Sister    Parkinsonism Maternal Grandfather        Tobacco Use    Smoking status: Never Smoker    Smokeless tobacco: Not on file   Substance and Sexual Activity    Alcohol use: Not on file    Drug use: Not on file    Sexual activity: Not on file      Review of Systems   Constitutional: Positive for appetite change. Negative for chills and fever.   HENT: Negative for congestion, drooling and trouble swallowing.    Respiratory: Positive for cough, chest tightness and shortness of breath. Negative for wheezing.    Cardiovascular: Positive for chest pain. Negative for palpitations and leg swelling.   Gastrointestinal: Positive for abdominal pain. Negative for nausea and vomiting.   Genitourinary: Positive for flank pain. Negative for dysuria and frequency.   Musculoskeletal: Negative for neck pain and neck stiffness.   Neurological: Positive for light-headedness. Negative for seizures and syncope.   Psychiatric/Behavioral: Negative for agitation and confusion.     Objective:     Vital Signs (Most Recent):  Temp: 97.6 °F (36.4 °C) (02/06/22 0309)  Pulse: (!) 118 (02/06/22 0410)  Resp: (!) 36 (02/06/22 0410)  BP: 137/71 (02/06/22 0410)  SpO2: 100 % (02/06/22 0410) Vital Signs (24h Range):  Temp:  [97.6 °F (36.4 °C)] 97.6 °F (36.4 °C)  Pulse:  [118-140] 118  Resp:  [26-36] 36  SpO2:  [97 %-100 %] 100 %  BP: (137-168)/(71-86) 137/71   Weight: 68 kg (150 lb)  Body mass index is 26.57 kg/m².    No intake or output data in the 24  hours ending 02/06/22 0530    Physical Exam  Vitals and nursing note reviewed.   Constitutional:       General: She is not in acute distress.     Appearance: She is ill-appearing.   HENT:      Head: Normocephalic and atraumatic.      Right Ear: External ear normal.      Left Ear: External ear normal.      Nose: Nose normal.      Mouth/Throat:      Mouth: Mucous membranes are moist.      Pharynx: Oropharynx is clear.   Eyes:      Conjunctiva/sclera: Conjunctivae normal.   Cardiovascular:      Rate and Rhythm: Regular rhythm. Tachycardia present.      Pulses: Normal pulses.      Heart sounds: Murmur heard.       Pulmonary:      Effort: Tachypnea present.      Breath sounds: Normal breath sounds.   Abdominal:      General: Abdomen is flat. Bowel sounds are normal. There is no distension.      Palpations: Abdomen is soft.      Tenderness: There is no abdominal tenderness.   Musculoskeletal:         General: No swelling or deformity. Normal range of motion.      Cervical back: Normal range of motion and neck supple.   Skin:     General: Skin is warm and dry.      Capillary Refill: Capillary refill takes less than 2 seconds.   Neurological:      General: No focal deficit present.      Mental Status: She is alert and oriented to person, place, and time.   Psychiatric:         Mood and Affect: Mood normal.         Behavior: Behavior normal.         Thought Content: Thought content normal.         Judgment: Judgment normal.         Vents:     Lines/Drains/Airways     Peripheral Intravenous Line                 Peripheral IV - Single Lumen 02/06/22 0345 22 G Right Hand <1 day         Peripheral IV - Single Lumen 02/06/22 0449 20 G Left Antecubital <1 day              Significant Labs:    CBC/Anemia Profile:  Recent Labs   Lab 02/06/22  0349 02/06/22  0459   WBC 39.84*  --    HGB 15.8  --    HCT 52.4* 49   *  --    MCV 94  --    RDW 14.0  --         Chemistries:  No results for input(s): NA, K, CL, CO2, BUN, CREATININE,  CALCIUM, ALBUMIN, PROT, BILITOT, ALKPHOS, ALT, AST, GLUCOSE, MG, PHOS in the last 48 hours.    All pertinent labs within the past 24 hours have been reviewed.    Significant Imaging: I have reviewed all pertinent imaging results/findings within the past 24 hours.    Assessment/Plan:     Renal/  Lactic acidosis  Elevated lactic acidosis to 5.4 likely again associated with hypovolemia due to emesis.     Plan  - Will continue aggressive IVF hydration  - Trend lactate    FRANKY (acute kidney injury)  Patient with Cr of 2.1 on admission with BUN of 29. Patient with likely dehydration in setting of persistent emesis and supports prerenal etiology.     Plan  - No further evaluation  - Will continue with aggressive IVF and strict I and Os   - Trend Cr    Endocrine  * DKA (diabetic ketoacidosis)  Patient presents with BAUER/SOB, N/V and abdominal pain prior to admission concerning for DKA. Patient denies previous history of DKA and reports diabetes diagnosis in 2014 and currently uses insulin pump. Last A1C of 8. Patient denies any malfunction with pump or any other inciting factors for DKA but patient reports abdominal pain and emesis. On admission CXR demonstrates  LLL opacity and elevated WBC which could indicate COVID as inciting factor for DKA but patient currently on RA with no significant symptoms and tachypnea likely associated with acidosis (pH of 6.9). Hyperkalemic on istat with K 7.3 from acidosis and no EKG changes. Na of 125 likely pseudohyponatremia in setting of BG >500.        Plan  - DKA order set with insulin gtt and IVF  - NPO for now but could also consider bariatric diet while on insulin gtt  - Q1 BG and Q4 BMP  - Will target AG   - Endocrine consultation for continuation of insulin pump upon gap closure   - Ca gluconate and can consider shifting depending on CMP  - Will follow up cultures to rule out other causes for DKA        DM type 1, not at goal  See DKA    - Endocrine consultation on DC  - Diabetes  education    Other  COVID-19 virus infection  - COVID-19 testing:  Positive on 2/6/22. Vaccination 2x but not boosted  - Isolation: Airborne/Droplet. Surgical mask on patient. Notify Infection Control  - Diagnostics: Trend Q48hrs if stable, more frequently if patient decompensating Please edit to represent your individualized workup       - CBC       - CMP       - Mg        - D-dimer       - Ferritin       - CRP       - CPK       - LDH       - Vitamin D       - BNP       - Troponin       - Procalcitonin       - Sputum Culture       - Blood Culture       - Urinalysis with reflex culture       - ECG       - CXR- demonstrated LLL opacity  Lymphopenia, hyponatremia, hyperferritinemia, elevated troponin, elevated d-dimer, age, and medical comorbidities are significant predictors of poor clinical outcome    Patient with presentation of DKA but also reports cough and SOB prior to admission. Patient with SpO2 >95 on RA but tachypnea associated with acidosis. COVID high risk evaluation scoring with 1 point for DM and there for will not initiate Rem or dex.             - [facility] Protocol      - Will obtain blood cultures, UA and respiratory cultures  - Telemetry & Continuous Pulse Oximetry  - VTE PPx: heparin SQ unless contraindicated  - Will continue vancomycin and zosyn at this time and can deescalate to CAP coverage   - Will not initiate Remdesivir or Dexamethasone 6mg PO/IV for 10 days as patient is on room air.           Critical Care Daily Checklist:    A: Awake: RASS Goal/Actual Goal:    Actual:     B: Spontaneous Breathing Trial Performed?     C: SAT & SBT Coordinated?  N/a    D: Delirium: CAM-ICU     E: Early Mobility Performed? Yes   F: Feeding Goal:    Status:     Current Diet Order   Procedures    Diet NPO      AS: Analgesia/Sedation    T: Thromboembolic Prophylaxis Heparin    H: HOB > 300 Yes   U: Stress Ulcer Prophylaxis (if needed)    G: Glucose Control Insuline gtt     B: Bowel  Function     I: Indwelling Catheter (Lines & Schmidt) Necessity    D: De-escalation of Antimicrobials/Pharmacotherapies panda Mcdaniels     Plan for the day/ETD     Code Status:  Family/Goals of Care: Full Code         Critical secondary to Patient has a condition that poses threat to life and bodily function: sever DKA     Critical care was time spent personally by me on the following activities: development of treatment plan with patient or surrogate and bedside caregivers, discussions with consultants, evaluation of patient's response to treatment, examination of patient, ordering and performing treatments and interventions, ordering and review of laboratory studies, ordering and review of radiographic studies, pulse oximetry, re-evaluation of patient's condition. This critical care time did not overlap with that of any other provider or involve time for any procedures.     Luna Hidalgo MD  Critical Care Medicine  [facility] Dept

## 2022-02-06 NOTE — ED NOTES
I-STAT Chem-8+ Results:   Value Reference Range   Sodium 125 136-145 mmol/L   Potassium  7.0 3.5-5.1 mmol/L   Chloride 101  mmol/L   Ionized Calcium 1.13 1.06-1.42 mmol/L   CO2 (measured) 7 23-29 mmol/L   Glucose >700  mg/dL   BUN 34 6-30 mg/dL   Creatinine 1.1 0.5-1.4 mg/dL   Hematocrit 52 36-54%

## 2022-02-06 NOTE — HPI
"Mr. Duran is 28 y/o with PMHx IDDMI diagnosed at age of 13 years on insuline pump Ha1c around 8. Who presented to ED for one day of nausea and vomiting. She reports started feeling nauseous yesterday afternoon when she was at work and felt weak. She went home has been feel nauseous and has been drinking Sprit and water. She have been vomiting and stared having b/l subcostal abdominal pain and flank pain. She reports feeling SOB and she thought she it is from " volume overload" because she has been drinking a lot of fluid to keep herself hydrated, however she never had any heart failure issues before. She fever, chills, cough, chest pain, diarrhea, skin rashes dysuria or urinary symptoms. 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 She reports good compliance with her insuline treatment and she has been on insuline pump since 2014. She never had a DKA before and never been admitted to the hospital for diabetes complications.     In ED, she was she looks ill and dry, she was tachypnic and tachycardic  but otherwise afebrile and sating well RA. Lab significant for leukocytosis WBC 39, BS >500, Hydroxybutyrate 4.8, Istat K 7.3, lactic acid 5.4, HCO3 <5. She also found with FRANKY Cr 2.1 and positive for COVID. VBG with PH 6.93, pCO2 22. EKG with sinus tach and elevated P wave. CXR with some atelectatic changes and some LLL opacity, but obvious consolidation. She was given a bolus of IV insuline and started on gtt and IVF with LR. She was give calcium gluconate. Pt will be admitted to MICU for management of sever DKA.       "

## 2022-02-06 NOTE — LETTER
February 8, 2022    Debra Duran  [address]  [place] [facility] [address]                [address]  [facility] [address]  Phone: [phone]  Fax: [phone] To whom it may concern,    This is to certify that Ms. Debra Duran is suitable to return to work on 2/12.    If you have any questions or concerns, please don't hesitate to call.    Sincerely,        Judith Parkinson MD

## 2022-02-06 NOTE — CARE UPDATE
Pt admitted to ICU for DKA  Remains on insulin gtt; endocrine consulted  Blood glucoses remain > 500, anion gap 25, bicarb 5  Will monitor throughout the day and aim to stepdown once pH and bicarb improved

## 2022-02-06 NOTE — ASSESSMENT & PLAN NOTE
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  Endorses compliance with home insulin pump denies any pump malfunction in proper storage of insulin.   Admitted for DKA    Consulted for:   DKA  DM type:  T1D on pump  A1C: The patient doesn't have any registry metric data available  Hypoglycemia:  none  Steroids:   none  Diet/Tfs:    Bariatric clears  Glucose Goals:  140-180 mg/dL    Glucose Trend x 24hr:        Home Regimen:    Medtronic 670 G with settings of basal 1.0 units/hour, carb ratio 1-10, ISF 1-50, been on the same settings for greater than 1 year     Attempting to establish with Endocrinology and has upcoming appointment with Dr. Escalante on 02/14  Clinically improving  Insulin pump has been suspended while inpatient on intensive insulin drip    PLAN:  Intensive insulin drip as above

## 2022-02-06 NOTE — CONSULTS
Patient evaluated by and admitted to Critical Care Medicine. Full H&P to follow.    Judy Hubbard NP  Critical Care Medicine

## 2022-02-06 NOTE — ED PROVIDER NOTES
Encounter Date: 2/6/2022       History     Chief Complaint   Patient presents with    Emesis     Pt arrives c/o emesis and dizziness accompanied by SOB x 18 hours.     HPI     This is a 27-year-old woman with a history of insulin-dependent DM a 1 who presents with acute onset nausea vomiting since 1 p.m. yesterday, with associated dizziness and feeling short of breath due to the abdominal pain that is associated with it.  She reports the abdominal pain is right upper quadrant and epigastric in nature, has never had anything similar to this before she checked her blood sugar yesterday mid day and was in 200s, she has not checked it since, but has been trying to keep down Sprite.  She uses an insulin pump, no errors as far she is aware.  No fevers, chills, hematemesis, diarrhea, dysuria or other complaints.  Review of patient's allergies indicates:  No Known Allergies  Past Medical History:   Diagnosis Date    Diabetes mellitus type 1     H/O cold sores     Heart murmur      No past surgical history on file.  Family History   Problem Relation Age of Onset    No Known Problems Mother     Hypertension Father     Hyperlipidemia Father     No Known Problems Sister     Breast cancer Maternal Grandmother     Parkinsonism Maternal Grandfather     Alzheimer's disease Paternal Grandmother     Alzheimer's disease Paternal Grandfather      Social History     Tobacco Use    Smoking status: Never Smoker     Review of Systems   Constitutional: Negative for chills, diaphoresis, fatigue and fever.   HENT: Negative for congestion, rhinorrhea and sore throat.    Eyes: Negative for visual disturbance.   Respiratory: Positive for shortness of breath. Negative for cough and chest tightness.    Cardiovascular: Negative for chest pain.   Gastrointestinal: Positive for abdominal pain, nausea and vomiting. Negative for blood in stool, constipation and diarrhea.   Genitourinary: Negative for dysuria, hematuria and urgency.    Musculoskeletal: Negative for back pain.   Skin: Negative for rash.   Neurological: Negative for seizures and syncope.   Hematological: Does not bruise/bleed easily.   Psychiatric/Behavioral: Negative for agitation and hallucinations.       Physical Exam     Initial Vitals [02/06/22 0309]   BP Pulse Resp Temp SpO2   (!) 168/86 (!) 140 (!) 26 97.6 °F (36.4 °C) 97 %      MAP       --         Physical Exam  Gen: AxOx3, tachypneic, ill appearing with peripheral cyanosis, appears stated age  Eye: EOMI, no scleral icterus, no periorbital edema or ecchymosis  Head: normocephalic, atraumatic, no lesions, scalp appears normal  ENT: neck supple, no stridor, no masses, no drooling or voice changes  CVS: RRR, no m/r/g, distal pulses intact/symmetric  Pulm: CTAB, no wheezes, rales or rhonchi,significantly increased work of breathing with tachypnea, no tripoding  Abd: soft, TTP in epigastrium and RUQ with slight guarding, nondistended, no organomegaly, no CVAT  Ext: no edema, no lesions, rashes, or deformity  Neuro: GCS15, moving all extremities, face grossly symmetric  Psych: normal affect, cooperative, well groomed, makes good eye contact    ED Course   Procedures  Labs Reviewed   CBC W/ AUTO DIFFERENTIAL - Abnormal; Notable for the following components:       Result Value    WBC 39.84 (*)     RBC 5.59 (*)     Hematocrit 52.4 (*)     MCHC 30.2 (*)     Platelets 533 (*)     Immature Granulocytes 3.5 (*)     Gran # (ANC) 33.9 (*)     Immature Grans (Abs) 1.38 (*)     Mono # 2.2 (*)     Baso # 0.32 (*)     Gran % 85.1 (*)     Lymph % 5.0 (*)     All other components within normal limits   BETA - HYDROXYBUTYRATE, SERUM - Abnormal; Notable for the following components:    Beta-Hydroxybutyrate 4.8 (*)     All other components within normal limits   SARS-COV-2 RDRP GENE - Abnormal; Notable for the following components:    POC Rapid COVID Positive (*)     All other components within normal limits    Narrative:     This test utilizes  isothermal nucleic acid amplification   technology to detect the SARS-CoV-2 RdRp nucleic acid segment.   The analytical sensitivity (limit of detection) is 125 genome   equivalents/mL.   A POSITIVE result implies infection with the SARS-CoV-2 virus;   the patient is presumed to be contagious.     A NEGATIVE result means that SARS-CoV-2 nucleic acids are not   present above the limit of detection. A NEGATIVE result should be   treated as presumptive. It does not rule out the possibility of   COVID-19 and should not be the sole basis for treatment decisions.   If COVID-19 is strongly suspected based on clinical and exposure   history, re-testing using an alternate molecular assay should be   considered.   This test is only for use under the Food and Drug   Administration s Emergency Use Authorization (EUA).   Commercial kits are provided by [employer].   Performance characteristics of the EUA have been independently   verified by [facility] Department of   Pathology and Laboratory Medicine.   _________________________________________________________________   The authorized Fact Sheet for Healthcare Providers and the authorized Fact   Sheet for Patients of the ID NOW COVID-19 are available on the FDA   website:     https://www.fda.gov/media/395074/download  https://www.fda.gov/media/483000/download           POCT GLUCOSE - Abnormal; Notable for the following components:    POCT Glucose >500 (*)     All other components within normal limits   ISTAT PROCEDURE - Abnormal; Notable for the following components:    POC PH 6.932 (*)     POC PCO2 22.0 (*)     POC HCO3 4.6 (*)     POC SATURATED O2 69 (*)     POC TCO2 5 (*)     All other components within normal limits   ISTAT PROCEDURE - Abnormal; Notable for the following components:    POC Sodium 125 (*)     POC Potassium 7.3 (*)     All other components within normal limits   CULTURE, BLOOD   CULTURE, BLOOD   CULTURE, RESPIRATORY   URINALYSIS, REFLEX TO  URINE CULTURE   LACTIC ACID, PLASMA   COMPREHENSIVE METABOLIC PANEL   LIPASE   TROPONIN I   PROCALCITONIN   TOXICOLOGY SCREEN, URINE, RANDOM (COMPLIANCE)   LACTATE DEHYDROGENASE   C-REACTIVE PROTEIN   FERRITIN   POCT URINE PREGNANCY   POCT GLUCOSE MONITORING CONTINUOUS   POCT GLUCOSE MONITORING CONTINUOUS   ISTAT CHEM8          Imaging Results          X-Ray Chest AP Portable (Final result)  Result time 02/06/22 05:14:55    Final result by Navarro Loredo MD (02/06/22 05:14:55)                 Impression:      Diminished depth of inspiration and atelectatic change noted, mild asymmetric density at the left lung base may relate to mild asymmetric atelectatic change or mild basilar infiltrate without dense consolidation.    Moderate to prominent air-filled distention of the stomach with associated mild elevation of the left hemidiaphragm, nonspecific appearance, clinical and historical correlation is otherwise needed.      Electronically signed by: Navarro Loredo  Date:    02/06/2022  Time:    05:14             Narrative:    EXAMINATION:  XR CHEST AP PORTABLE    CLINICAL HISTORY:  hyperglycemia;    TECHNIQUE:  Single frontal view of the chest was performed.    COMPARISON:  None    FINDINGS:  Single portable chest view is submitted.  There is mild diminished depth of inspiration there is minimal elevation of the left hemidiaphragm.  The cardiomediastinal silhouette appears appropriate when accounting for the aforementioned.    Mild accentuation of pulmonary bronchovascular markings consistent with diminished depth of inspiration is noted.  Mild greater density at the left lung base may in part relate to mild greater crowding associated with elevation of the left hemidiaphragm and atelectatic change, there may be a component of mild superimposed basilar infiltrate.  There is no evidence for significant pleural effusion or pneumothorax.    There appears to be moderate to prominent air distention of the stomach which  may contribute to elevation of the left hemidiaphragm, this is a nonspecific finding, clinical correlation is needed.  The osseous structures appear intact                                 Medications   dextrose 10 % infusion (has no administration in time range)   dextrose 10 % infusion (has no administration in time range)   sodium chloride 0.9% flush 10 mL (has no administration in time range)   insulin regular in 0.9 % NaCl 100 unit/100 mL (1 unit/mL) infusion (3 Units/hr Intravenous New Bag 2/6/22 0513)   dextrose 10% bolus 125 mL (has no administration in time range)   dextrose 10% bolus 250 mL (has no administration in time range)   sodium chloride 0.9% flush 10 mL (has no administration in time range)   acetaminophen tablet 650 mg (has no administration in time range)   ondansetron injection 4 mg (4 mg Intravenous Given 2/6/22 0549)   calcium gluconate 1 g in dextrose 5 % 100 mL IVPB (premix) (1 g Intravenous New Bag 2/6/22 0553)   vancomycin - pharmacy to dose (has no administration in time range)   sodium chloride 0.9% bolus 1,000 mL (0 mLs Intravenous Stopped 2/6/22 0524)   ondansetron injection 4 mg (4 mg Intravenous Given 2/6/22 0357)   morphine injection 4 mg (4 mg Intravenous Given 2/6/22 0410)   piperacillin-tazobactam 4.5 g in sodium chloride 0.9% 100 mL IVPB (ready to mix system) (4.5 g Intravenous New Bag 2/6/22 0502)   insulin regular injection 6 Units (6 Units Intravenous Given 2/6/22 0528)     Medical Decision Making:   History:   Old Medical Records: I decided to obtain old medical records.  Old Records Summarized: records from clinic visits.  Initial Assessment:   This 27-year-old woman who presents with tachypnea, tachycardia, in the setting of acute onset nausea vomiting and upper abdominal pain.  Her blood sugar on arrival is greater than 500. Differential is broad but includes diabetic ketoacidosis, which may be the cause of her symptoms or in addition to.  Differential also includes  cholecystitis, pancreatitis, gastritis, versus less likely pneumonia or nephrolithiasis.  Plan for broad labs, fluids, antiemetics, and close monitoring.  Clinical Tests:   Lab Tests: Reviewed and Ordered  Radiological Study: Ordered and Reviewed  ED Management:  Patient's labs are notable for profound diabetic ketoacidosis with a pH of 6.9, bicarb before and a potassium of 7.  I have initiated fluid resuscitation and insulin drip.  Her chest x-ray by my independent interpretation is unremarkable.  Her COVID is positive.  I did interrogate her insulin pump, and it did not show any obvious malfunction, though her last bolus was at 10:00 p.m. last night.  I do wonder if her glucometer is malfunctioning.  Regardless she remains critically ill, and I discussed the patient with Critical Care Medicine who has accepted her for admission.  At time of sign out, CT scan of the abdomen and pelvis is pending to further evaluate the etiology of her pain and possible precipitating cause of her DKA.  Other:   I have discussed this case with another health care provider.            Attending Attestation:         Attending Critical Care:   Critical Care Times:   Direct Patient Care (initial evaluation, reassessments, and time considering the case)................................................................40 minutes.   Additional History from reviewing old medical records or taking additional history from the family, EMS, PCP, etc.......................5 minutes.   Ordering, Reviewing, and Interpreting Diagnostic Studies...............................................................................................................10 minutes.   Consultation with other Physicians. .................................................................................................................................................5 minutes.   ==============================================================  · Total Critical Care Time -  exclusive of procedural time: 60 minutes.  ==============================================================  Critical care was necessary to treat or prevent imminent or life-threatening deterioration of the following conditions: metabolic crisis.   Critical care was time spent personally by me on the following activities: obtaining history from patient or relative, examination of patient, review of old charts, ordering lab, x-rays, and/or EKG, development of treatment plan with patient or relative, ordering and performing treatments and interventions, evaluation of patient's response to treatment, discussion with consultants, interpretation of cardiac measurements and re-evaluation of patient's conition.   Critical Care Condition: critical           ED Course as of 02/06/22 0557   Sun Feb 06, 2022   0453 6.9 pH, bicarb 4 on VBG [EB]   0459 K 7.3 on gas [EB]      ED Course User Index  [EB] Samantha Contreras MD             Clinical Impression:   Final diagnoses:  [R00.0] Tachycardia  [R73.9] Hyperglycemia          ED Disposition Condition    Admit               Samantha Contreras MD  02/06/22 0559

## 2022-02-06 NOTE — PROVIDER PROGRESS NOTES - EMERGENCY DEPT.
Encounter Date: 2/6/2022    ED Physician Progress Notes         EKG - STEMI Decision  Initial Reading: No STEMI present.

## 2022-02-06 NOTE — ASSESSMENT & PLAN NOTE
Elevated lactic acidosis to 5.4 likely again associated with hypovolemia due to emesis.     Plan  - Will continue aggressive IVF hydration  - Trend lactate

## 2022-02-06 NOTE — ASSESSMENT & PLAN NOTE
- COVID-19 testing:  Positive on 2/6/22. Vaccination 2x but not boosted  - Isolation: Airborne/Droplet. Surgical mask on patient. Notify Infection Control  - Diagnostics: Trend Q48hrs if stable, more frequently if patient decompensating Please edit to represent your individualized workup       - CBC       - CMP       - Mg        - D-dimer       - Ferritin       - CRP       - CPK       - LDH       - Vitamin D       - BNP       - Troponin       - Procalcitonin       - Sputum Culture       - Blood Culture       - Urinalysis with reflex culture       - ECG       - CXR- demonstrated LLL opacity  Lymphopenia, hyponatremia, hyperferritinemia, elevated troponin, elevated d-dimer, age, and medical comorbidities are significant predictors of poor clinical outcome    Patient with presentation of DKA but also reports cough and SOB prior to admission. Patient with SpO2 >95 on RA but tachypnea associated with acidosis. COVID high risk evaluation scoring with 1 point for DM and there for will not initiate Rem or dex. Will continue with             - Management: Per [facility] COVID Treatment Protocol      - Will obtain UA and respiratory cultures  - Telemetry & Continuous Pulse Oximetry  - VTE PPx: heparin SQ unless contraindicated  - Will continue vancomycin and zosyn at this time and can deescalate to CAP coverage   - Will not initiate Remdesivir or Dexamethasone 6mg PO/IV for 10 days as patient is on room air.

## 2022-02-06 NOTE — ASSESSMENT & PLAN NOTE
Patient with Cr of 2.1 on admission with BUN of 29. Patient with likely dehydration in setting of persistent emesis and supports prerenal etiology.     Plan  - No further evaluation  - Will continue with aggressive IVF and strict I and Os   - Trend Cr

## 2022-02-06 NOTE — HPI
Reason for Consult: Management of T1DM, DKA    Diabetes diagnosis year: 2014    Home Diabetes Medications:  Per patient, settings, Medtronic 670 G, chronic settings greater than 1 year:  Basal 1.0 units/hour from 7AM till MN and 0.975 units/hr from MN to 7AM, ICR 1:10,  ISF 1:50    How often checking glucose at home?  Twice daily AM PM    BG readings on regimen: 150-200s  Hypoglycemia on the regimen?  No  Missed doses on regimen?  occ. Prandial bolus    Diabetes Complications include:   DKA      HPI:   Patient is a 27 y.o. female with a h/o T1D on Medtronic pump since 2014.  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  She began feeling lightheaded, nausea followed by emesis and abdominal discomfort.  She attempted to bolus after realizing her glucose increasing above 200 in did not recheck due to feeling ill.  She attempted fluid hydration with both Sprite and water.  She endorses prior compliance with her insulin pump checking infrequently only morning and night before bed typically running 180-2 0s and occasionally missing prandial insulin.  She recently moved from [place] a 1.5 years prior and has been attempting to establish with a local endocrinologist.  Denies prior episodes of DKA  Denies any known sick contacts, fever, night sweat, chill, polyuria, weight loss, polydipsia.    In ED, she was tachypnic and tachycardic  but otherwise afebrile and sating well RA. Lab significant for leukocytosis WBC 39, BS >500, Hydroxybutyrate 4.8, Istat K 7.3, lactic acid 5.4, HCO3 <5. She also found with FRANKY Cr 2.1 and positive for COVID. VBG with PH 6.93, pCO2 22. She was given a bolus of IV insuline and started on gtt and IVF with LR. Admitted on 2/06 to MICU for management of severe DKA.     Endocrinology consulted for DKA believed to be  precipitated by COVID infection versus acute gastritis    She has an upcoming appointment with endocrinology, Dr. Escalante, on 2/14/22

## 2022-02-06 NOTE — SUBJECTIVE & OBJECTIVE
Interval HPI:   Overnight events: none  Eating:   <25%  Nausea: No  Hypoglycemia and intervention: No  Fever: No  TPN and/or TF: No  If yes, type of TF/TPN and rate: na    PMH, PSH, FH, SH updated and reviewed     ROS:  A complete 14 point review of systems conducted negative except for stated above.  Current Medications and/or Treatments Impacting Glycemic Control  Immunotherapy:    Immunosuppressants     None        Steroids:   Hormones (From admission, onward)            None        Pressors:    Autonomic Drugs (From admission, onward)            None        Hyperglycemia/Diabetes Medications:   Antihyperglycemics (From admission, onward)            Start     Stop Route Frequency Ordered    02/06/22 0530  insulin regular in 0.9 % NaCl 100 unit/100 mL (1 unit/mL) infusion        Question Answer Comment   Insulin Rate Adjustment (DO NOT MODIFY ANSWER) \\[email or web address]    Enter initial dose from Infusion Protocol Chart (Units/hr): 3        -- IV Continuous 02/06/22 0421             PHYSICAL EXAMINATION:  Vitals:    02/06/22 0700   BP: 131/74   Pulse: (!) 130   Resp: (!) 27   Temp: 98 °F (36.7 °C)     Body mass index is 26.57 kg/m².    Physical exam not performed due to minimizing providers exposed to COVID

## 2022-02-06 NOTE — SUBJECTIVE & OBJECTIVE
Past Medical History:   Diagnosis Date    Diabetes mellitus type 1     H/O cold sores     Heart murmur        No past surgical history on file.    Review of patient's allergies indicates:  No Known Allergies    Family History     Problem Relation (Age of Onset)    Alzheimer's disease Paternal Grandmother, Paternal Grandfather    Breast cancer Maternal Grandmother    Hyperlipidemia Father    Hypertension Father    No Known Problems Mother, Sister    Parkinsonism Maternal Grandfather        Tobacco Use    Smoking status: Never Smoker    Smokeless tobacco: Not on file   Substance and Sexual Activity    Alcohol use: Not on file    Drug use: Not on file    Sexual activity: Not on file      Review of Systems   Constitutional: Positive for appetite change. Negative for chills and fever.   HENT: Negative for congestion, drooling and trouble swallowing.    Respiratory: Positive for cough, chest tightness and shortness of breath. Negative for wheezing.    Cardiovascular: Positive for chest pain. Negative for palpitations and leg swelling.   Gastrointestinal: Positive for abdominal pain. Negative for nausea and vomiting.   Genitourinary: Positive for flank pain. Negative for dysuria and frequency.   Musculoskeletal: Negative for neck pain and neck stiffness.   Neurological: Positive for light-headedness. Negative for seizures and syncope.   Psychiatric/Behavioral: Negative for agitation and confusion.     Objective:     Vital Signs (Most Recent):  Temp: 97.6 °F (36.4 °C) (02/06/22 0309)  Pulse: (!) 118 (02/06/22 0410)  Resp: (!) 36 (02/06/22 0410)  BP: 137/71 (02/06/22 0410)  SpO2: 100 % (02/06/22 0410) Vital Signs (24h Range):  Temp:  [97.6 °F (36.4 °C)] 97.6 °F (36.4 °C)  Pulse:  [118-140] 118  Resp:  [26-36] 36  SpO2:  [97 %-100 %] 100 %  BP: (137-168)/(71-86) 137/71   Weight: 68 kg (150 lb)  Body mass index is 26.57 kg/m².    No intake or output data in the 24 hours ending 02/06/22 0530    Physical Exam  Vitals and  nursing note reviewed.   Constitutional:       General: She is not in acute distress.     Appearance: She is ill-appearing.   HENT:      Head: Normocephalic and atraumatic.      Right Ear: External ear normal.      Left Ear: External ear normal.      Nose: Nose normal.      Mouth/Throat:      Mouth: Mucous membranes are moist.      Pharynx: Oropharynx is clear.   Eyes:      Conjunctiva/sclera: Conjunctivae normal.   Cardiovascular:      Rate and Rhythm: Regular rhythm. Tachycardia present.      Pulses: Normal pulses.      Heart sounds: Murmur heard.       Pulmonary:      Effort: Tachypnea present.      Breath sounds: Normal breath sounds.   Abdominal:      General: Abdomen is flat. Bowel sounds are normal. There is no distension.      Palpations: Abdomen is soft.      Tenderness: There is no abdominal tenderness.   Musculoskeletal:         General: No swelling or deformity. Normal range of motion.      Cervical back: Normal range of motion and neck supple.   Skin:     General: Skin is warm and dry.      Capillary Refill: Capillary refill takes less than 2 seconds.   Neurological:      General: No focal deficit present.      Mental Status: She is alert and oriented to person, place, and time.   Psychiatric:         Mood and Affect: Mood normal.         Behavior: Behavior normal.         Thought Content: Thought content normal.         Judgment: Judgment normal.         Vents:     Lines/Drains/Airways     Peripheral Intravenous Line                 Peripheral IV - Single Lumen 02/06/22 0345 22 G Right Hand <1 day         Peripheral IV - Single Lumen 02/06/22 0449 20 G Left Antecubital <1 day              Significant Labs:    CBC/Anemia Profile:  Recent Labs   Lab 02/06/22  0349 02/06/22  0459   WBC 39.84*  --    HGB 15.8  --    HCT 52.4* 49   *  --    MCV 94  --    RDW 14.0  --         Chemistries:  No results for input(s): NA, K, CL, CO2, BUN, CREATININE, CALCIUM, ALBUMIN, PROT, BILITOT, ALKPHOS, ALT, AST,  GLUCOSE, MG, PHOS in the last 48 hours.    All pertinent labs within the past 24 hours have been reviewed.    Significant Imaging: I have reviewed all pertinent imaging results/findings within the past 24 hours.

## 2022-02-06 NOTE — PROGRESS NOTES
Pharmacokinetic Initial Assessment: IV Vancomycin    Assessment/Plan:    Initiate intravenous vancomycin with loading dose of 1250 mg once with subsequent doses when random concentrations are less than 20 mcg/mL  Desired empiric serum trough concentration is 15 to 20 mcg/mL  Draw vancomycin random level on 2/6 at 1800.  Pharmacy will continue to follow and monitor vancomycin.      Please contact pharmacy at extension [phone] with any questions regarding this assessment.     Thank you for the consult,   Breana Brennan       Patient brief summary:  Debra Duran is a 27 y.o. female initiated on antimicrobial therapy with IV Vancomycin for treatment of suspected sepsis    Drug Allergies:   Review of patient's allergies indicates:  No Known Allergies    Actual Body Weight:   68 kg    Renal Function:   Estimated Creatinine Clearance: 36.9 mL/min (A) (based on SCr of 2.1 mg/dL (H)).     Dialysis Method (if applicable):  N/A    CBC (last 72 hours):  Recent Labs   Lab Result Units 02/06/22  0349   WBC K/uL 39.84*   Hemoglobin g/dL 15.8   Hematocrit % 52.4*   Platelets K/uL 533*   Gran % % 85.1*   Lymph % % 5.0*   Mono % % 5.5   Eosinophil % % 0.1   Basophil % % 0.8   Differential Method  Automated       Metabolic Panel (last 72 hours):  Recent Labs   Lab Result Units 02/06/22  0454   Sodium mmol/L 127*   Potassium mmol/L 7.2*   Chloride mmol/L 93*   CO2 mmol/L <5*   Glucose mg/dL 873*   BUN mg/dL 29*   Creatinine mg/dL 2.1*   Albumin g/dL 4.6   Total Bilirubin mg/dL 1.1*   Alkaline Phosphatase U/L 153*   AST U/L 21   ALT U/L 19       Drug levels (last 3 results):  No results for input(s): VANCOMYCINRA, VANCOMYCINPE, VANCOMYCINTR in the last 72 hours.    Microbiologic Results:  Microbiology Results (last 7 days)     Procedure Component Value Units Date/Time    Blood culture #1 **CANNOT BE ORDERED STAT** [[id number]] Collected: 02/06/22 6047    Order Status: Sent Specimen: Blood from Peripheral, Antecubital, Left Updated:  02/06/22 0507    Blood culture #2 **CANNOT BE ORDERED STAT** [[id number]] Collected: 02/06/22 0453    Order Status: Sent Specimen: Blood from Peripheral, Hand, Right Updated: 02/06/22 0507    Culture, Respiratory with Gram Stain [[id number]]     Order Status: No result Specimen: Respiratory

## 2022-02-06 NOTE — CONSULTS
[facility] - Intensive Care ([facility]-)  Endocrinology  Diabetes Consult Note    Consult Requested by: Emelia Newsome MD   Reason for admit: DKA (diabetic ketoacidosis)    HISTORY OF PRESENT ILLNESS:  Reason for Consult: Management of T1DM, DKA    Diabetes diagnosis year: 2014    Home Diabetes Medications:  Per patient, settings, Medtronic 670 G, chronic settings greater than 1 year:  Basal 1.0 units/hour, ICR 1:10,  ISF 1:50    How often checking glucose at home?  Twice daily AM PM    BG readings on regimen: 150-200s  Hypoglycemia on the regimen?  No  Missed doses on regimen?  occ. Prandial bolus    Diabetes Complications include:   DKA      HPI:   Patient is a 27 y.o. female with a h/o T1D on Medtronic pump since 2014.  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  She began feeling lightheaded, nausea followed by emesis and abdominal discomfort.  She attempted to bolus after realizing her glucose increasing above 200 in did not recheck due to feeling ill.  She attempted fluid hydration with both Sprite and water.  She endorses prior compliance with her insulin pump checking infrequently only morning and night before bed typically running 180-2 0s and occasionally missing prandial insulin.  She recently moved from [place] a 1.5 years prior and has been attempting to establish with a local endocrinologist.  Denies prior episodes of DKA  Denies any known sick contacts, fever, night sweat, chill, polyuria, weight loss, polydipsia.    In ED, she was tachypnic and tachycardic  but otherwise afebrile and sating well RA. Lab significant for leukocytosis WBC 39, BS >500, Hydroxybutyrate 4.8, Istat K 7.3, lactic acid 5.4, HCO3 <5. She also found with FRANKY Cr 2.1 and positive for COVID. VBG with PH 6.93, pCO2 22. She was given a bolus of IV  insuline and started on gtt and IVF with LR. Admitted on 2/06 to MICU for management of severe DKA.     Endocrinology consulted for DKA believed to be precipitated by COVID infection versus acute gastritis    She has an upcoming appointment with endocrinology, Dr. Escalante, on 2/14/22        Interval HPI:   Overnight events: none  Eating:   <25%  Nausea: No  Hypoglycemia and intervention: No  Fever: No  TPN and/or TF: No  If yes, type of TF/TPN and rate: na    PMH, PSH, FH, SH updated and reviewed     ROS:  A complete 14 point review of systems conducted negative except for stated above.  Current Medications and/or Treatments Impacting Glycemic Control  Immunotherapy:    Immunosuppressants     None        Steroids:   Hormones (From admission, onward)            None        Pressors:    Autonomic Drugs (From admission, onward)            None        Hyperglycemia/Diabetes Medications:   Antihyperglycemics (From admission, onward)            Start     Stop Route Frequency Ordered    02/06/22 0530  insulin regular in 0.9 % NaCl 100 unit/100 mL (1 unit/mL) infusion        Question Answer Comment   Insulin Rate Adjustment (DO NOT MODIFY ANSWER) \\[email or web address]    Enter initial dose from Infusion Protocol Chart (Units/hr): 3        -- IV Continuous 02/06/22 0421             PHYSICAL EXAMINATION:  Vitals:    02/06/22 0700   BP: 131/74   Pulse: (!) 130   Resp: (!) 27   Temp: 98 °F (36.7 °C)     Body mass index is 26.57 kg/m².    Physical exam not performed due to minimizing providers exposed to COVID      Labs Reviewed and Include   Recent Labs   Lab 02/06/22  0454 02/06/22  0454 02/06/22  1000   *   < > 524*   CALCIUM 9.8   < > 9.7   ALBUMIN 4.6  --   --    PROT 8.7*  --   --    *   < > 128*   K 7.2*   < > 5.0   CO2 <5*   < > 5*   CL 93*   < > 98   BUN 29*   < > 28*   CREATININE 2.1*   < > 1.9*   ALKPHOS 153*  --   --    ALT 19  --   --    AST 21  --   --     BILITOT 1.1*  --   --     < > = values in this interval not displayed.     Lab Results   Component Value Date    WBC 39.84 (H) 02/06/2022    HGB 15.8 02/06/2022    HCT 49 02/06/2022    MCV 94 02/06/2022     (H) 02/06/2022     No results for input(s): TSH, FREET4 in the last 168 hours.  No results found for: HGBA1C    Nutritional status:   Body mass index is 26.57 kg/m².  Lab Results   Component Value Date    ALBUMIN 4.6 02/06/2022     No results found for: PREALBUMIN    Estimated Creatinine Clearance: 40.8 mL/min (A) (based on SCr of 1.9 mg/dL (H)).    Accu-Checks  Recent Labs     02/06/22  0332 02/06/22  0713 02/06/22  0729 02/06/22  0945 02/06/22  1134 02/06/22  1259   POCTGLUCOSE >500* >500* >500* >500* >500* 311*        ASSESSMENT and PLAN    * DKA (diabetic ketoacidosis)  -  At this time still in DKA given parameters  -  Hospital medicine to continue to manage DKA    Recommendation    -  Consider using DKA Pathway to help with orders  -  Q1H accuchecks  -  Aggressive IVF, recommend checking renal function panel Q6 hr  -  Aggresively correction of electrolytes (K, Ma, Phos) during this time  -  Start clear liquid sugar free diet for now if tolerating    Can convert to subcutaneous insulin only if :  -  Once Bicarb >18  -  Anion gap is <12  -  Glucose <200 x2 reads   -  Without requiring increasing insulin rate,  -  Tolerating diet w/o nausea/vomiting    -  Can consider D5 with 1/2 NS or NS fluid  (depending on sodium levels) in the interim to maintain glucose until those parameters are met  -  Recommendation rate of 50-75 ml/hr to avoid hyperglycemia        FRANKY (acute kidney injury)  Improving with fluids, avoid insulin stacking      COVID-19 virus infection  Respiratory status, stable  In COVID unit for COVID positive infection likely precipitating DKA    DM type 1, not at goal  Brief Hx:     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  Endorses compliance with home insulin pump denies any pump malfunction in proper storage of insulin.   Admitted for DKA    Consulted for:   DKA  DM type:  T1D on pump  A1C: The patient doesn't have any registry metric data available  Hypoglycemia:  none  Steroids:   none  Diet/Tfs:    Bariatric clears  Glucose Goals:  140-180 mg/dL    Glucose Trend x 24hr:        Home Regimen:    Medtronic 670 G with settings of basal 1.0 units/hour, carb ratio 1-10, ISF 1-50, been on the same settings for greater than 1 year     Attempting to establish with Endocrinology and has upcoming appointment with Dr. Escalante on 02/14  Clinically improving  Insulin pump has been suspended while inpatient on intensive insulin drip    PLAN:  Intensive insulin drip as above          Plan discussed with patient, family, and RN at bedside.     Monty Lay MD  Endocrinology  [facility] - Intensive Care ([place] [id number])

## 2022-02-06 NOTE — ED TRIAGE NOTES
Pt presents to ED tonight with c/o nausea and vomiting for the past two days. Pt states that she was at work two days ago and left around 1 PM. Pt states she has been vomiting and nauseous since and unable to keep and food down. Pt states sh has been drinking plenty of liquids, Pt denies blood in emesis.

## 2022-02-06 NOTE — ASSESSMENT & PLAN NOTE
-  At this time still in DKA given parameters  -  Hospital medicine to continue to manage DKA    Recommendation    -  Consider using DKA Pathway to help with orders  -  Q1H accuchecks  -  Aggressive IVF, recommend checking renal function panel Q6 hr  -  Aggresively correction of electrolytes (K, Ma, Phos) during this time  -  Start clear liquid sugar free diet for now if tolerating    Can convert to subcutaneous insulin only if :  -  Once Bicarb >18  -  Anion gap is <12  -  Glucose <200 x2 reads   -  Without requiring increasing insulin rate,  -  Tolerating diet w/o nausea/vomiting    -  Can consider D5 with 1/2 NS or NS fluid  (depending on sodium levels) in the interim to maintain glucose until those parameters are met  -  Recommendation rate of 50-75 ml/hr to avoid hyperglycemia

## 2022-02-06 NOTE — ASSESSMENT & PLAN NOTE
Patient presents with BAUER/SOB, N/V and abdominal pain prior to admission concerning for DKA. Patient denies previous history of DKA and reports diabetes diagnosis in 2014 and currently uses insulin pump. Last A1C of 8. Patient denies any malfunction with pump or any other inciting factors for DKA but patient reports abdominal pain and emesis. On admission CXR demonstrates  LLL opacity and elevated WBC which could indicate COVID as inciting factor for DKA but patient currently on RA with no significant symptoms and tachypnea likely associated with acidosis (pH of 6.9). Hyperkalemic on istat with K 7.3 from acidosis and no EKG changes. Na of 125 likely pseudohyponatremia in setting of BG >500.        Plan  - DKA order set with insulin gtt and IVF  - NPO for now but could also consider bariatric diet while on insulin gtt  - Q1 BG and Q4 BMP  - Will target AG   - Endocrine consultation for continuation of insulin pump upon gap closure   - Ca gluconate and can consider shifting depending on CMP  - Will follow up cultures to rule out other causes for DKA

## 2022-02-07 LAB
ALBUMIN SERPL BCP-MCNC: 4 G/DL (ref 3.5–5.2)
ALLENS TEST: ABNORMAL
ALP SERPL-CCNC: 119 U/L (ref 55–135)
ALT SERPL W/O P-5'-P-CCNC: 14 U/L (ref 10–44)
ANION GAP SERPL CALC-SCNC: 10 MMOL/L (ref 8–16)
ANION GAP SERPL CALC-SCNC: 11 MMOL/L (ref 8–16)
ANION GAP SERPL CALC-SCNC: 11 MMOL/L (ref 8–16)
ANION GAP SERPL CALC-SCNC: 16 MMOL/L (ref 8–16)
ANION GAP SERPL CALC-SCNC: 16 MMOL/L (ref 8–16)
ANION GAP SERPL CALC-SCNC: 8 MMOL/L (ref 8–16)
ANISOCYTOSIS BLD QL SMEAR: SLIGHT
AST SERPL-CCNC: 15 U/L (ref 10–40)
BASOPHILS # BLD AUTO: 0.03 K/UL (ref 0–0.2)
BASOPHILS NFR BLD: 0.1 % (ref 0–1.9)
BILIRUB DIRECT SERPL-MCNC: 0.4 MG/DL (ref 0.1–0.3)
BILIRUB SERPL-MCNC: 1 MG/DL (ref 0.1–1)
BUN SERPL-MCNC: 13 MG/DL (ref 6–20)
BUN SERPL-MCNC: 16 MG/DL (ref 6–20)
BUN SERPL-MCNC: 34 MG/DL (ref 6–30)
BUN SERPL-MCNC: 34 MG/DL (ref 6–30)
BUN SERPL-MCNC: 6 MG/DL (ref 6–20)
BUN SERPL-MCNC: 7 MG/DL (ref 6–20)
CALCIUM SERPL-MCNC: 10 MG/DL (ref 8.7–10.5)
CALCIUM SERPL-MCNC: 10 MG/DL (ref 8.7–10.5)
CALCIUM SERPL-MCNC: 8.8 MG/DL (ref 8.7–10.5)
CALCIUM SERPL-MCNC: 8.9 MG/DL (ref 8.7–10.5)
CALCIUM SERPL-MCNC: 9.4 MG/DL (ref 8.7–10.5)
CALCIUM SERPL-MCNC: 9.9 MG/DL (ref 8.7–10.5)
CHLORIDE SERPL-SCNC: 101 MMOL/L (ref 95–110)
CHLORIDE SERPL-SCNC: 103 MMOL/L (ref 95–110)
CHLORIDE SERPL-SCNC: 104 MMOL/L (ref 95–110)
CO2 SERPL-SCNC: 13 MMOL/L (ref 23–29)
CO2 SERPL-SCNC: 15 MMOL/L (ref 23–29)
CO2 SERPL-SCNC: 19 MMOL/L (ref 23–29)
CO2 SERPL-SCNC: 20 MMOL/L (ref 23–29)
CO2 SERPL-SCNC: 21 MMOL/L (ref 23–29)
CO2 SERPL-SCNC: 22 MMOL/L (ref 23–29)
CREAT SERPL-MCNC: 0.9 MG/DL (ref 0.5–1.4)
CREAT SERPL-MCNC: 0.9 MG/DL (ref 0.5–1.4)
CREAT SERPL-MCNC: 1 MG/DL (ref 0.5–1.4)
CREAT SERPL-MCNC: 1 MG/DL (ref 0.5–1.4)
CREAT SERPL-MCNC: 1.1 MG/DL (ref 0.5–1.4)
CREAT SERPL-MCNC: 1.2 MG/DL (ref 0.5–1.4)
DELSYS: ABNORMAL
DIFFERENTIAL METHOD: ABNORMAL
EOSINOPHIL # BLD AUTO: 0 K/UL (ref 0–0.5)
EOSINOPHIL NFR BLD: 0.1 % (ref 0–8)
ERYTHROCYTE [DISTWIDTH] IN BLOOD BY AUTOMATED COUNT: 11.7 % (ref 11.5–14.5)
EST. GFR  (AFRICAN AMERICAN): >60 ML/MIN/1.73 M^2
EST. GFR  (NON AFRICAN AMERICAN): >60 ML/MIN/1.73 M^2
GLUCOSE SERPL-MCNC: 196 MG/DL (ref 70–110)
GLUCOSE SERPL-MCNC: 239 MG/DL (ref 70–110)
GLUCOSE SERPL-MCNC: 248 MG/DL (ref 70–110)
GLUCOSE SERPL-MCNC: 269 MG/DL (ref 70–110)
GLUCOSE SERPL-MCNC: 290 MG/DL (ref 70–110)
GLUCOSE SERPL-MCNC: 368 MG/DL (ref 70–110)
GLUCOSE SERPL-MCNC: 369 MG/DL (ref 70–110)
GLUCOSE SERPL-MCNC: >700 MG/DL (ref 70–110)
GLUCOSE SERPL-MCNC: >700 MG/DL (ref 70–110)
HCO3 UR-SCNC: 3.8 MMOL/L (ref 24–28)
HCT VFR BLD AUTO: 41.3 % (ref 37–48.5)
HCT VFR BLD CALC: 49 %PCV (ref 36–54)
HCT VFR BLD CALC: 50 %PCV (ref 36–54)
HCT VFR BLD CALC: 52 %PCV (ref 36–54)
HGB BLD-MCNC: 13.4 G/DL (ref 12–16)
HYPOCHROMIA BLD QL SMEAR: ABNORMAL
IMM GRANULOCYTES # BLD AUTO: 0.23 K/UL (ref 0–0.04)
IMM GRANULOCYTES NFR BLD AUTO: 0.8 % (ref 0–0.5)
LACTATE SERPL-SCNC: 0.8 MMOL/L (ref 0.5–2.2)
LYMPHOCYTES # BLD AUTO: 1 K/UL (ref 1–4.8)
LYMPHOCYTES NFR BLD: 3.7 % (ref 18–48)
MAGNESIUM SERPL-MCNC: 1.7 MG/DL (ref 1.6–2.6)
MCH RBC QN AUTO: 27.7 PG (ref 27–31)
MCHC RBC AUTO-ENTMCNC: 32.4 G/DL (ref 32–36)
MCV RBC AUTO: 86 FL (ref 82–98)
MODE: ABNORMAL
MONOCYTES # BLD AUTO: 1.6 K/UL (ref 0.3–1)
MONOCYTES NFR BLD: 5.7 % (ref 4–15)
NEUTROPHILS # BLD AUTO: 24.5 K/UL (ref 1.8–7.7)
NEUTROPHILS NFR BLD: 89.6 % (ref 38–73)
NRBC BLD-RTO: 0 /100 WBC
OVALOCYTES BLD QL SMEAR: ABNORMAL
PCO2 BLDA: 21.3 MMHG (ref 35–45)
PH SMN: 6.86 [PH] (ref 7.35–7.45)
PHOSPHATE SERPL-MCNC: 1.6 MG/DL (ref 2.7–4.5)
PLATELET # BLD AUTO: 358 K/UL (ref 150–450)
PMV BLD AUTO: 10.7 FL (ref 9.2–12.9)
PO2 BLDA: 59 MMHG (ref 40–60)
POC BE: -30 MMOL/L
POC IONIZED CALCIUM: 1.13 MMOL/L (ref 1.06–1.42)
POC IONIZED CALCIUM: 1.26 MMOL/L (ref 1.06–1.42)
POC IONIZED CALCIUM: 1.32 MMOL/L (ref 1.06–1.42)
POC SATURATED O2: 66 % (ref 95–100)
POC TCO2 (MEASURED): 7 MMOL/L (ref 23–29)
POC TCO2 (MEASURED): 7 MMOL/L (ref 23–29)
POC TCO2: <5 MMOL/L (ref 24–29)
POCT GLUCOSE: 197 MG/DL (ref 70–110)
POCT GLUCOSE: 218 MG/DL (ref 70–110)
POCT GLUCOSE: 234 MG/DL (ref 70–110)
POCT GLUCOSE: 235 MG/DL (ref 70–110)
POCT GLUCOSE: 248 MG/DL (ref 70–110)
POCT GLUCOSE: 253 MG/DL (ref 70–110)
POCT GLUCOSE: 255 MG/DL (ref 70–110)
POCT GLUCOSE: 258 MG/DL (ref 70–110)
POCT GLUCOSE: 260 MG/DL (ref 70–110)
POCT GLUCOSE: 278 MG/DL (ref 70–110)
POCT GLUCOSE: 299 MG/DL (ref 70–110)
POCT GLUCOSE: 315 MG/DL (ref 70–110)
POCT GLUCOSE: 336 MG/DL (ref 70–110)
POCT GLUCOSE: 355 MG/DL (ref 70–110)
POCT GLUCOSE: >500 MG/DL (ref 70–110)
POIKILOCYTOSIS BLD QL SMEAR: SLIGHT
POLYCHROMASIA BLD QL SMEAR: ABNORMAL
POTASSIUM BLD-SCNC: 5.7 MMOL/L (ref 3.5–5.1)
POTASSIUM BLD-SCNC: 5.7 MMOL/L (ref 3.5–5.1)
POTASSIUM BLD-SCNC: 7 MMOL/L (ref 3.5–5.1)
POTASSIUM SERPL-SCNC: 3.5 MMOL/L (ref 3.5–5.1)
POTASSIUM SERPL-SCNC: 3.6 MMOL/L (ref 3.5–5.1)
POTASSIUM SERPL-SCNC: 3.6 MMOL/L (ref 3.5–5.1)
POTASSIUM SERPL-SCNC: 3.7 MMOL/L (ref 3.5–5.1)
POTASSIUM SERPL-SCNC: 3.7 MMOL/L (ref 3.5–5.1)
POTASSIUM SERPL-SCNC: 4.2 MMOL/L (ref 3.5–5.1)
PROT SERPL-MCNC: 7.4 G/DL (ref 6–8.4)
RBC # BLD AUTO: 4.83 M/UL (ref 4–5.4)
SAMPLE: ABNORMAL
SITE: ABNORMAL
SODIUM BLD-SCNC: 125 MMOL/L (ref 136–145)
SODIUM BLD-SCNC: 127 MMOL/L (ref 136–145)
SODIUM BLD-SCNC: 128 MMOL/L (ref 136–145)
SODIUM SERPL-SCNC: 131 MMOL/L (ref 136–145)
SODIUM SERPL-SCNC: 132 MMOL/L (ref 136–145)
SODIUM SERPL-SCNC: 132 MMOL/L (ref 136–145)
SODIUM SERPL-SCNC: 133 MMOL/L (ref 136–145)
SODIUM SERPL-SCNC: 134 MMOL/L (ref 136–145)
SODIUM SERPL-SCNC: 135 MMOL/L (ref 136–145)
VANCOMYCIN SERPL-MCNC: <1.4 UG/ML
WBC # BLD AUTO: 27.39 K/UL (ref 3.9–12.7)

## 2022-02-07 PROCEDURE — 80202 ASSAY OF VANCOMYCIN: CPT | Performed by: INTERNAL MEDICINE

## 2022-02-07 PROCEDURE — 63600175 PHARM REV CODE 636 W HCPCS: Performed by: NURSE PRACTITIONER

## 2022-02-07 PROCEDURE — 99223 1ST HOSP IP/OBS HIGH 75: CPT | Mod: ,,, | Performed by: INTERNAL MEDICINE

## 2022-02-07 PROCEDURE — 25000003 PHARM REV CODE 250: Performed by: NURSE PRACTITIONER

## 2022-02-07 PROCEDURE — 63600175 PHARM REV CODE 636 W HCPCS

## 2022-02-07 PROCEDURE — 63600175 PHARM REV CODE 636 W HCPCS: Performed by: INTERNAL MEDICINE

## 2022-02-07 PROCEDURE — 80076 HEPATIC FUNCTION PANEL: CPT | Performed by: NURSE PRACTITIONER

## 2022-02-07 PROCEDURE — 80048 BASIC METABOLIC PNL TOTAL CA: CPT | Mod: 91 | Performed by: INTERNAL MEDICINE

## 2022-02-07 PROCEDURE — 25000003 PHARM REV CODE 250: Performed by: INTERNAL MEDICINE

## 2022-02-07 PROCEDURE — 20000000 HC ICU ROOM

## 2022-02-07 PROCEDURE — 63600175 PHARM REV CODE 636 W HCPCS: Performed by: GENERAL ACUTE CARE HOSPITAL

## 2022-02-07 PROCEDURE — 25000003 PHARM REV CODE 250

## 2022-02-07 PROCEDURE — 99233 SBSQ HOSP IP/OBS HIGH 50: CPT | Mod: ,,, | Performed by: INTERNAL MEDICINE

## 2022-02-07 PROCEDURE — 25000003 PHARM REV CODE 250: Performed by: EMERGENCY MEDICINE

## 2022-02-07 PROCEDURE — 84100 ASSAY OF PHOSPHORUS: CPT | Performed by: NURSE PRACTITIONER

## 2022-02-07 PROCEDURE — 83605 ASSAY OF LACTIC ACID: CPT | Performed by: STUDENT IN AN ORGANIZED HEALTH CARE EDUCATION/TRAINING PROGRAM

## 2022-02-07 PROCEDURE — 76937 US GUIDE VASCULAR ACCESS: CPT

## 2022-02-07 PROCEDURE — 80048 BASIC METABOLIC PNL TOTAL CA: CPT | Mod: 91 | Performed by: NURSE PRACTITIONER

## 2022-02-07 PROCEDURE — 36410 VNPNXR 3YR/> PHY/QHP DX/THER: CPT | Mod: ,,, | Performed by: NURSE PRACTITIONER

## 2022-02-07 PROCEDURE — 94761 N-INVAS EAR/PLS OXIMETRY MLT: CPT

## 2022-02-07 PROCEDURE — 99223 PR INITIAL HOSPITAL CARE,LEVL III: ICD-10-PCS | Mod: ,,, | Performed by: INTERNAL MEDICINE

## 2022-02-07 PROCEDURE — 99233 PR SUBSEQUENT HOSPITAL CARE,LEVL III: ICD-10-PCS | Mod: ,,, | Performed by: INTERNAL MEDICINE

## 2022-02-07 PROCEDURE — S5010 5% DEXTROSE AND 0.45% SALINE: HCPCS

## 2022-02-07 PROCEDURE — 85025 COMPLETE CBC W/AUTO DIFF WBC: CPT | Performed by: NURSE PRACTITIONER

## 2022-02-07 PROCEDURE — 36410 PR VENIPUNC NEED PHYS SKILL,DX OR RX: ICD-10-PCS | Mod: ,,, | Performed by: NURSE PRACTITIONER

## 2022-02-07 PROCEDURE — 80048 BASIC METABOLIC PNL TOTAL CA: CPT | Performed by: NURSE PRACTITIONER

## 2022-02-07 PROCEDURE — 83735 ASSAY OF MAGNESIUM: CPT | Performed by: NURSE PRACTITIONER

## 2022-02-07 PROCEDURE — 27000207 HC ISOLATION

## 2022-02-07 PROCEDURE — 82947 ASSAY GLUCOSE BLOOD QUANT: CPT | Performed by: INTERNAL MEDICINE

## 2022-02-07 RX ORDER — ENOXAPARIN SODIUM 100 MG/ML
40 INJECTION SUBCUTANEOUS EVERY 24 HOURS
Status: DISCONTINUED | OUTPATIENT
Start: 2022-02-07 | End: 2022-02-08 | Stop reason: HOSPADM

## 2022-02-07 RX ORDER — MUPIROCIN 20 MG/G
OINTMENT TOPICAL 2 TIMES DAILY
Status: DISCONTINUED | OUTPATIENT
Start: 2022-02-07 | End: 2022-02-08 | Stop reason: HOSPADM

## 2022-02-07 RX ORDER — INSULIN ASPART 100 [IU]/ML
5 INJECTION, SOLUTION INTRAVENOUS; SUBCUTANEOUS ONCE
Status: DISCONTINUED | OUTPATIENT
Start: 2022-02-07 | End: 2022-02-07

## 2022-02-07 RX ORDER — DEXTROSE MONOHYDRATE AND SODIUM CHLORIDE 5; .45 G/100ML; G/100ML
INJECTION, SOLUTION INTRAVENOUS CONTINUOUS
Status: DISCONTINUED | OUTPATIENT
Start: 2022-02-07 | End: 2022-02-07

## 2022-02-07 RX ORDER — ONDANSETRON 2 MG/ML
4 INJECTION INTRAMUSCULAR; INTRAVENOUS ONCE
Status: COMPLETED | OUTPATIENT
Start: 2022-02-07 | End: 2022-02-07

## 2022-02-07 RX ORDER — IBUPROFEN 200 MG
24 TABLET ORAL
Status: DISCONTINUED | OUTPATIENT
Start: 2022-02-07 | End: 2022-02-08 | Stop reason: HOSPADM

## 2022-02-07 RX ORDER — GLUCAGON 1 MG
1 KIT INJECTION
Status: DISCONTINUED | OUTPATIENT
Start: 2022-02-07 | End: 2022-02-08 | Stop reason: HOSPADM

## 2022-02-07 RX ORDER — SODIUM,POTASSIUM PHOSPHATES 280-250MG
2 POWDER IN PACKET (EA) ORAL EVERY 4 HOURS
Status: COMPLETED | OUTPATIENT
Start: 2022-02-07 | End: 2022-02-07

## 2022-02-07 RX ORDER — IBUPROFEN 200 MG
16 TABLET ORAL
Status: DISCONTINUED | OUTPATIENT
Start: 2022-02-07 | End: 2022-02-08 | Stop reason: HOSPADM

## 2022-02-07 RX ORDER — SODIUM CHLORIDE, SODIUM LACTATE, POTASSIUM CHLORIDE, CALCIUM CHLORIDE 600; 310; 30; 20 MG/100ML; MG/100ML; MG/100ML; MG/100ML
INJECTION, SOLUTION INTRAVENOUS CONTINUOUS
Status: DISCONTINUED | OUTPATIENT
Start: 2022-02-07 | End: 2022-02-07

## 2022-02-07 RX ADMIN — HUMAN INSULIN 1 UNITS: 100 INJECTION, SOLUTION SUBCUTANEOUS at 08:02

## 2022-02-07 RX ADMIN — POTASSIUM & SODIUM PHOSPHATES POWDER PACK 280-160-250 MG 2 PACKET: 280-160-250 PACK at 09:02

## 2022-02-07 RX ADMIN — MUPIROCIN: 20 OINTMENT TOPICAL at 09:02

## 2022-02-07 RX ADMIN — ACETAMINOPHEN 650 MG: 325 TABLET ORAL at 02:02

## 2022-02-07 RX ADMIN — ONDANSETRON 4 MG: 2 INJECTION INTRAMUSCULAR; INTRAVENOUS at 04:02

## 2022-02-07 RX ADMIN — PIPERACILLIN AND TAZOBACTAM 4.5 G: 4; .5 INJECTION, POWDER, LYOPHILIZED, FOR SOLUTION INTRAVENOUS; PARENTERAL at 12:02

## 2022-02-07 RX ADMIN — POTASSIUM & SODIUM PHOSPHATES POWDER PACK 280-160-250 MG 2 PACKET: 280-160-250 PACK at 05:02

## 2022-02-07 RX ADMIN — SODIUM CHLORIDE, SODIUM LACTATE, POTASSIUM CHLORIDE, AND CALCIUM CHLORIDE: .6; .31; .03; .02 INJECTION, SOLUTION INTRAVENOUS at 08:02

## 2022-02-07 RX ADMIN — HUMAN INSULIN 1 UNITS: 100 INJECTION, SOLUTION SUBCUTANEOUS at 09:02

## 2022-02-07 RX ADMIN — DEXTROSE AND SODIUM CHLORIDE: 5; .45 INJECTION, SOLUTION INTRAVENOUS at 08:02

## 2022-02-07 RX ADMIN — INSULIN HUMAN 3.8 UNITS/HR: 1 INJECTION, SOLUTION INTRAVENOUS at 09:02

## 2022-02-07 RX ADMIN — PIPERACILLIN AND TAZOBACTAM 4.5 G: 4; .5 INJECTION, POWDER, LYOPHILIZED, FOR SOLUTION INTRAVENOUS; PARENTERAL at 09:02

## 2022-02-07 RX ADMIN — ACETAMINOPHEN 650 MG: 325 TABLET ORAL at 07:02

## 2022-02-07 RX ADMIN — HUMAN INSULIN 1 UNITS: 100 INJECTION, SOLUTION SUBCUTANEOUS at 07:02

## 2022-02-07 RX ADMIN — HUMAN INSULIN 1 UNITS: 100 INJECTION, SOLUTION SUBCUTANEOUS at 10:02

## 2022-02-07 RX ADMIN — VANCOMYCIN HYDROCHLORIDE 1250 MG: 1.25 INJECTION, POWDER, LYOPHILIZED, FOR SOLUTION INTRAVENOUS at 07:02

## 2022-02-07 RX ADMIN — ENOXAPARIN SODIUM 40 MG: 100 INJECTION SUBCUTANEOUS at 09:02

## 2022-02-07 RX ADMIN — DEXTROSE MONOHYDRATE AND SODIUM CHLORIDE: 5; .45 INJECTION, SOLUTION INTRAVENOUS at 12:02

## 2022-02-07 RX ADMIN — HUMAN INSULIN 1 UNITS: 100 INJECTION, SOLUTION SUBCUTANEOUS at 11:02

## 2022-02-07 RX ADMIN — INSULIN HUMAN 5 UNITS: 100 INJECTION, SOLUTION PARENTERAL at 09:02

## 2022-02-07 RX ADMIN — ONDANSETRON 4 MG: 2 INJECTION INTRAMUSCULAR; INTRAVENOUS at 10:02

## 2022-02-07 NOTE — ASSESSMENT & PLAN NOTE
COVID-19 testing:  Positive on 2/6/22. Vaccination 2x but not boosted  Isolation: Airborne/Droplet. Surgical mask on patient. Notify Infection Control  Diagnostics: Trend Q48hrs if stable, more frequently if patient decompensating       - CBC       - CMP       - Mg        - D-dimer       - Ferritin       - CRP       - CPK       - LDH       - Vitamin D       - BNP       - Troponin       - Procalcitonin       - Sputum Culture       - Blood Culture       - Urinalysis with reflex culture       - ECG       - CXR- demonstrated LLL opacity  Lymphopenia, hyponatremia, hyperferritinemia, elevated troponin, elevated d-dimer, age, and medical comorbidities are significant predictors of poor clinical outcome    Patient with presentation of DKA but also reports cough and SOB prior to admission. Patient with SpO2 >95 on RA but tachypnea associated with acidosis. COVID high risk evaluation scoring with 1 point for DM and there for will not initiate Rem or dex.     Leukocytosis improving  Lactate normalized    Plan:  Will obtain UA and respiratory cultures  Telemetry & Continuous Pulse Oximetry  VTE PPx: heparin SQ   Will continue vancomycin and zosyn at this time and can deescalate to CAP coverage   Will not initiate Remdesivir or Dexamethasone 6mg PO/IV for 10 days as patient is on room air.

## 2022-02-07 NOTE — CARE UPDATE
"Following-up BMPs throughout the day - issues with delayed collects  BMP collected at 16:00  Initially stated "in process," then noticed it was no longer in the labs tab  Sample was hemolyzed and cancelled by lab, not notified   Will need to recollect BMP ASAP as well as iSTAT to check electrolytes  Fluids also changed to d5w 0.45% NaCl given last blood glucose was 151  "

## 2022-02-07 NOTE — PROGRESS NOTES
Pharmacokinetic Assessment Follow Up: IV Vancomycin    Vancomycin serum concentration assessment(s):    Delayed lab results, vancomycin random very subtherapeutic.  FRANKY resolving    Vancomycin Regimen Plan:    Continue pulse dosing  Will reload patient with IV vancomycin 1250 mg x 1 dose  Random level scheduled for 1830 on 2/7    Drug levels (last 3 results):  Recent Labs   Lab Result Units 02/07/22  0400   Vancomycin, Random ug/mL <1.4       Pharmacy will continue to follow and monitor vancomycin.    Please contact pharmacy at extension [phone] for questions regarding this assessment.    Thank you for the consult,   Breana Brennan       Patient brief summary:  Debra Duran is a 27 y.o. female initiated on antimicrobial therapy with IV Vancomycin for treatment of sepsis    Drug Allergies:   Review of patient's allergies indicates:  No Known Allergies    Actual Body Weight:   68 kg    Renal Function:   Estimated Creatinine Clearance: 64.6 mL/min (based on SCr of 1.2 mg/dL).     Dialysis Method (if applicable):  N/A    CBC (last 72 hours):  Recent Labs   Lab Result Units 02/06/22  0349 02/06/22  1413 02/07/22  0400   WBC K/uL 39.84*  --  27.39*   Hemoglobin g/dL 15.8  --  13.4   Hemoglobin A1C %  --  12.0*  --    Hematocrit % 52.4*  --  41.3   Platelets K/uL 533*  --  358   Gran % % 85.1*  --  89.6*   Lymph % % 5.0*  --  3.7*   Mono % % 5.5  --  5.7   Eosinophil % % 0.1  --  0.1   Basophil % % 0.8  --  0.1   Differential Method  Automated  --  Automated       Metabolic Panel (last 72 hours):  Recent Labs   Lab Result Units 02/06/22  0454 02/06/22  0601 02/06/22  0602 02/06/22  1000 02/06/22  1906 02/07/22  0020   Sodium mmol/L 127*  --   --  128* 134* 132*   Potassium mmol/L 7.2*  --   --  5.0 4.2 4.2   Chloride mmol/L 93*  --   --  98 104 101   CO2 mmol/L <5*  --   --  5* 13* 15*   Glucose mg/dL 873*  --   --  524* 172* 248*   Glucose, UA   --  3+*  --   --   --   --    BUN mg/dL 29*  --   --  28* 19 16    Creatinine mg/dL 2.1*  --   --  1.9* 1.3 1.2   Creatinine, Urine mg/dL  --   --  27.0  --   --   --    Albumin g/dL 4.6  --   --   --   --   --    Total Bilirubin mg/dL 1.1*  --   --   --   --   --    Alkaline Phosphatase U/L 153*  --   --   --   --   --    AST U/L 21  --   --   --   --   --    ALT U/L 19  --   --   --   --   --        Vancomycin Administrations:  vancomycin given in the last 96 hours                   vancomycin 1.25 g in dextrose 5% 250 mL IVPB (ready to mix) ()  Restarted 02/06/22 1120     1,250 mg New Bag  0950                Microbiologic Results:  Microbiology Results (last 7 days)     Procedure Component Value Units Date/Time    Blood culture #2 **CANNOT BE ORDERED STAT** [[id number]] Collected: 02/06/22 0453    Order Status: Completed Specimen: Blood from Peripheral, Hand, Right Updated: 02/07/22 0613     Blood Culture, Routine No Growth to date      No Growth to date    Blood culture #1 **CANNOT BE ORDERED STAT** [[id number]] Collected: 02/06/22 0453    Order Status: Completed Specimen: Blood from Peripheral, Antecubital, Left Updated: 02/07/22 0613     Blood Culture, Routine No Growth to date      No Growth to date    Culture, Respiratory with Gram Stain [[id number]]     Order Status: No result Specimen: Respiratory

## 2022-02-07 NOTE — PROGRESS NOTES
"[facility] ([place] [address])  Critical Care Medicine  Progress Note    Patient Name: Debra Duran  MRN: [id number]  Admission Date: 2/6/2022  Hospital Length of Stay: 1 days  Code Status: Full Code  Attending Provider: Rocío Powell MD  Primary Care Provider: Melvi Savage MD   Principal Problem: DKA (diabetic ketoacidosis)    Subjective:     HPI:  Mr. Duran is 26 y/o with PMHx IDDMI diagnosed at age of 13 years on insuline pump Ha1c around 8. Who presented to ED for one day of nausea and vomiting. She reports started feeling nauseous yesterday afternoon when she was at work and felt weak. She went home has been feel nauseous and has been drinking Sprit and water. She have been vomiting and stared having b/l subcostal abdominal pain and flank pain. She reports feeling SOB and she thought she it is from " volume overload" because she has been drinking a lot of fluid to keep herself hydrated, however she never had any heart failure issues before. She fever, chills, cough, chest pain, diarrhea, skin rashes dysuria or urinary symptoms. 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 She reports good compliance with her insuline treatment and she has been on insuline pump since 2014. She never had a DKA before and never been admitted to the hospital for diabetes complications.     In ED, she was she looks ill and dry, she was tachypnic and tachycardic  but otherwise afebrile and sating well RA. Lab significant for leukocytosis WBC 39, BS >500, Hydroxybutyrate 4.8, Istat K 7.3, lactic acid 5.4, HCO3 <5. She also found with FRANKY Cr 2.1 and positive for COVID. VBG with PH 6.93, pCO2 22. EKG with sinus tach and elevated P wave. CXR with some atelectatic changes and some LLL opacity, but obvious consolidation. She was given a bolus of IV insuline and started on gtt and IVF with LR. She was give calcium gluconate. Pt will be admitted to MICU for management of sever DKA. "         Hospital/ICU Course:  Admitted to ICU for close monitoring of DKA. Remains on insulin gtt. Electrolytes stable, anion gap closed. Endocrine consulted, appreciate recs. Will transition back to subq insulin today given acidosis improved and gap closed as long as Pt can tolerate oral intake.     Interval History/Significant Events: Remains on insulin gtt. Blood glucose improved, electrolytes stable, gap closed. Started on clear liquid diet, will see how Pt tolerates it. Aim to transition back to subq insulin today and likely stepdown    Review of Systems   Constitutional: Negative for fatigue and fever.   HENT: Negative.    Eyes: Negative.    Respiratory: Negative for cough, shortness of breath and wheezing.    Cardiovascular: Negative for chest pain, palpitations and leg swelling.   Gastrointestinal: Positive for abdominal pain, nausea and vomiting. Negative for diarrhea.   Genitourinary: Negative for dysuria and hematuria.   Musculoskeletal: Negative.    Skin: Negative.    Neurological: Negative for dizziness, light-headedness and headaches.   Psychiatric/Behavioral: Negative.      Objective:     Vital Signs (Most Recent):  Temp: 98.6 °F (37 °C) (02/07/22 0701)  Pulse: 93 (02/07/22 0813)  Resp: (!) 23 (02/07/22 0813)  BP: (!) 154/81 (02/07/22 0800)  SpO2: 100 % (02/07/22 0813) Vital Signs (24h Range):  Temp:  [97.9 °F (36.6 °C)-98.9 °F (37.2 °C)] 98.6 °F (37 °C)  Pulse:  [] 93  Resp:  [17-37] 23  SpO2:  [98 %-100 %] 100 %  BP: (120-161)/(65-93) 154/81   Weight: 68 kg (150 lb)  Body mass index is 26.57 kg/m².      Intake/Output Summary (Last 24 hours) at 2/7/2022 0833  Last data filed at 2/7/2022 0701  Gross per 24 hour   Intake 1687.27 ml   Output 900 ml   Net 787.27 ml       Physical Exam    Vents:     Lines/Drains/Airways     Peripheral Intravenous Line                 Peripheral IV - Single Lumen 02/06/22 0546 20 G Right Antecubital 1 day         Peripheral IV - Single Lumen 02/06/22 2230 22 G  Anterior;Right Forearm <1 day         Peripheral IV - Single Lumen 02/07/22 0127 Anterior;Right Upper Arm <1 day              Significant Labs:    CBC/Anemia Profile:  Recent Labs   Lab 02/06/22  0349 02/06/22  0459 02/06/22  0538 02/06/22 2028 02/07/22 0400   WBC 39.84*  --   --   --  27.39*   HGB 15.8  --   --   --  13.4   HCT 52.4* 49  --  30* 41.3   *  --   --   --  358   MCV 94  --   --   --  86   RDW 14.0  --   --   --  11.7   FERRITIN  --   --  102  --   --         Chemistries:  Recent Labs   Lab 02/06/22 0454 02/06/22 0454 02/06/22  1000 02/06/22  1906 02/07/22  0020   *   < > 128* 134* 132*   K 7.2*   < > 5.0 4.2 4.2   CL 93*   < > 98 104 101   CO2 <5*   < > 5* 13* 15*   BUN 29*   < > 28* 19 16   CREATININE 2.1*   < > 1.9* 1.3 1.2   CALCIUM 9.8   < > 9.7 10.0 10.0   ALBUMIN 4.6  --   --   --   --    PROT 8.7*  --   --   --   --    BILITOT 1.1*  --   --   --   --    ALKPHOS 153*  --   --   --   --    ALT 19  --   --   --   --    AST 21  --   --   --   --     < > = values in this interval not displayed.       Lactic Acid:   Recent Labs   Lab 02/06/22 0454 02/07/22 0400   LACTATE 5.4* 0.8     Urine Culture: No results for input(s): LABURIN in the last 48 hours.    Significant Imaging:  I have reviewed all pertinent imaging results/findings within the past 24 hours.      ABG  Recent Labs   Lab 02/06/22 2028   PH 7.316*   PO2 132*   PCO2 36.6   HCO3 18.7*   BE -7     Assessment/Plan:     Renal/  Lactic acidosis  Elevated lactic acidosis to 5.4 likely again associated with hypovolemia due to emesis.   Normalized to 0.8    Plan  - Will continue aggressive IVF hydration  - Trend lactate    RESOLVED    FRANKY (acute kidney injury)  Patient with Cr of 2.1 on admission with BUN of 29.   Likely prerenal in setting of persistent emesis and supports prerenal etiology.     Improving    Plan:  - No further evaluation  - Received fluid resuscitation   - Strict I's and O's  - Trend Cr    Endocrine  * DKA  (diabetic ketoacidosis)  Patient presents with BAUER/SOB, N/V and abdominal pain prior to admission concerning for DKA. Patient denies previous history of DKA and reports diabetes diagnosis in 2014 and currently uses insulin pump. Last A1C of 8. Patient denies any malfunction with pump or any other inciting factors for DKA but patient reports abdominal pain and emesis. On admission CXR demonstrates  LLL opacity and elevated WBC which could indicate COVID as inciting factor for DKA but patient currently on RA with no significant symptoms and tachypnea likely associated with acidosis (pH of 6.9). Hyperkalemic on istat with K 7.3 from acidosis and no EKG changes. Na of 125 likely pseudohyponatremia in setting of BG >500.    Improving  Electrolytes stable  Gap closed but remains high   Bicarb still low    Plan  - DKA order set with insulin gtt and IVF - run both D5W and LR  - Ca gluconate given for cardiac stabilization  - Q1h blood glucose, Q4h BMP  - gap now closed but remains on the higher end of normal; will continue insulin gtt for now and possibly transition later in the day   - clear liquid diet  - Endocrine consultation, appreciate recs        DM type 1, not at goal  See DKA    - Endocrine consult  - Diabetes education    Other  COVID-19 virus infection  COVID-19 testing:  Positive on 2/6/22. Vaccination 2x but not boosted  Isolation: Airborne/Droplet. Surgical mask on patient. Notify Infection Control  Diagnostics: Trend Q48hrs if stable, more frequently if patient decompensating       - CBC       - CMP       - Mg        - D-dimer       - Ferritin       - CRP       - CPK       - LDH       - Vitamin D       - BNP       - Troponin       - Procalcitonin       - Sputum Culture       - Blood Culture       - Urinalysis with reflex culture       - ECG       - CXR- demonstrated LLL opacity  Lymphopenia, hyponatremia, hyperferritinemia, elevated troponin, elevated d-dimer, age, and medical comorbidities are significant  predictors of poor clinical outcome    Patient with presentation of DKA but also reports cough and SOB prior to admission. Patient with SpO2 >95 on RA but tachypnea associated with acidosis. COVID high risk evaluation scoring with 1 point for DM and there for will not initiate Rem or dex.     Leukocytosis improving  Lactate normalized    Plan:  Will obtain UA and respiratory cultures  Telemetry & Continuous Pulse Oximetry  VTE PPx: lovenox  Will continue vancomycin and zosyn at this time and can deescalate to CAP coverage   Will not initiate Remdesivir or Dexamethasone 6mg PO/IV for 10 days as patient is on room air.        Critical Care Daily Checklist:    A: Awake: RASS Goal/Actual Goal: RASS Goal: 0-->alert and calm  Actual: Simmons Agitation Sedation Scale (RASS): Alert and calm   B: Spontaneous Breathing Trial Performed?     C: SAT & SBT Coordinated?  N/A                      D: Delirium: CAM-ICU Overall CAM-ICU: Negative   E: Early Mobility Performed? No   F: Feeding Goal:    Status:     Current Diet Order   Procedures    Diet Clear liquid (no sugar)/Bariatric      AS: Analgesia/Sedation N/A   T: Thromboembolic Prophylaxis heparin   H: HOB > 300 Yes   U: Stress Ulcer Prophylaxis (if needed) N/A   G: Glucose Control Improving    B: Bowel Function     I: Indwelling Catheter (Lines & Schmidt) Necessity PIVC x3   D: De-escalation of Antimicrobials/Pharmacotherapies Piptaz, vancomycin    Plan for the day/ETD Transition to subq insulin    Code Status:  Family/Goals of Care: Full Code       Critical secondary to Patient has a condition that poses threat to life and bodily function: DKA      Critical care was time spent personally by me on the following activities: development of treatment plan with patient or surrogate and bedside caregivers, discussions with consultants, evaluation of patient's response to treatment, examination of patient, ordering and performing treatments and interventions, ordering and review of  laboratory studies, ordering and review of radiographic studies, pulse oximetry, re-evaluation of patient's condition. This critical care time did not overlap with that of any other provider or involve time for any procedures.     Judith Parkinson MD  Critical Care Medicine  [facility] - Intensive Care ([facility]-15)

## 2022-02-07 NOTE — ASSESSMENT & PLAN NOTE
Elevated lactic acidosis to 5.4 likely again associated with hypovolemia due to emesis.   Normalized to 0.8    Plan  - Will continue aggressive IVF hydration  - Trend lactate    RESOLVED

## 2022-02-07 NOTE — ASSESSMENT & PLAN NOTE
Patient presents with BAUER/SOB, N/V and abdominal pain prior to admission concerning for DKA. Patient denies previous history of DKA and reports diabetes diagnosis in 2014 and currently uses insulin pump. Last A1C of 8. Patient denies any malfunction with pump or any other inciting factors for DKA but patient reports abdominal pain and emesis. On admission CXR demonstrates  LLL opacity and elevated WBC which could indicate COVID as inciting factor for DKA but patient currently on RA with no significant symptoms and tachypnea likely associated with acidosis (pH of 6.9). Hyperkalemic on istat with K 7.3 from acidosis and no EKG changes. Na of 125 likely pseudohyponatremia in setting of BG >500.    Improving  Electrolytes stable  Gap closed but remains high   Bicarb still low    Plan  - DKA order set with insulin gtt and IVF - run both D5W and LR  - Ca gluconate given for cardiac stabilization  - gap now closed but remains on the higher end of normal; will continue insulin gtt for now and possibly transition later in the day   - clear liquid diet  - Endocrine consultation, appreciate recs

## 2022-02-07 NOTE — ASSESSMENT & PLAN NOTE
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  Endorses compliance with home insulin pump denies any pump malfunction in proper storage of insulin.   Admitted for DKA    Consulted for:   DKA  DM type:  T1D on pump  A1C: 12.0  Hypoglycemia:  none  Steroids:   none  Diet/Tfs:    Bariatric clears  Glucose Goals:  140-180 mg/dL    Glucose Trend x 24hr:   Last     Home Regimen:    Medtronic 670 G with settings of basal 1.0 units/hour from 7AM till MN and 0.975 from MN till 7AM, carb ratio 1-10, ISF 1-50, been on the same settings for greater than 1 year     Attempting to establish with Endocrinology and has upcoming appointment with Dr. Escalante on 02/14  Clinically improving  Insulin pump has been suspended while inpatient on intensive insulin drip    PLAN:  Pt no longer on DKA  Pt's  is to bring pump supplies to start the insulin pump  Once insulin pump running for 90 minutes can DC insulin drip  Resume diabetic diet once on insulin pump is in place and pt is to bolus based on insulin pump settings with 1 units for every 8 grams of carbs while she is sick. Once no longer sick can return to her regular profile of 1 units of insulin for every 10 grams of carbs.     POCT q1hr for now, will space out to q4hrs once insulin drip requirements drop and BG stable. Once on insulin pump POCT to be done before meals, bedtime and 2am.    Once pump in place, pt is to let nurse know how much insulin she will be bolusing as it needs to be documented.

## 2022-02-07 NOTE — ASSESSMENT & PLAN NOTE
Patient presents with BAUER/SOB, N/V and abdominal pain prior to admission concerning for DKA. Patient denies previous history of DKA and reports diabetes diagnosis in 2014 and currently uses insulin pump. Last A1C of 8. Patient denies any malfunction with pump or any other inciting factors for DKA but patient reports abdominal pain and emesis. On admission CXR demonstrates  LLL opacity and elevated WBC which could indicate COVID as inciting factor for DKA but patient currently on RA with no significant symptoms and tachypnea likely associated with acidosis (pH of 6.9). Hyperkalemic on istat with K 7.3 from acidosis and no EKG changes. Na of 125 likely pseudohyponatremia in setting of BG >500.    Started on DKA pathway    Electrolytes stable  Ca gluconate given for cardiac stabilization  Q1h blood glucose, Q4h BMP  DKA RESOLVED    Plan:  Restarted on home insulin pump  DKA resolved, stable for discharge home  Endocrine consultation, appreciate recs  - will prescribe Lantus 20 units SQ daily, Novolog to be administered as per pump parameters in case the pump fails  Endocrine follow-up in clinic on 2/14

## 2022-02-07 NOTE — ASSESSMENT & PLAN NOTE
Patient with Cr of 2.1 on admission with BUN of 29.   Likely prerenal in setting of persistent emesis and supports prerenal etiology.     Improving    Plan:  - No further evaluation  - Received fluid resuscitation   - Dtrict I's and O's  - Trend Cr

## 2022-02-07 NOTE — SUBJECTIVE & OBJECTIVE
Interval History/Significant Events: Remains on insulin gtt. Blood glucose improved, electrolytes stable, gap closed. Started on clear liquid diet, will see how Pt tolerates it. Aim to transition back to subq insulin today and likely stepdown    Review of Systems   Constitutional: Negative for fatigue and fever.   HENT: Negative.    Eyes: Negative.    Respiratory: Negative for cough, shortness of breath and wheezing.    Cardiovascular: Negative for chest pain, palpitations and leg swelling.   Gastrointestinal: Positive for abdominal pain, nausea and vomiting. Negative for diarrhea.   Genitourinary: Negative for dysuria and hematuria.   Musculoskeletal: Negative.    Skin: Negative.    Neurological: Negative for dizziness, light-headedness and headaches.   Psychiatric/Behavioral: Negative.      Objective:     Vital Signs (Most Recent):  Temp: 98.6 °F (37 °C) (02/07/22 0701)  Pulse: 93 (02/07/22 0813)  Resp: (!) 23 (02/07/22 0813)  BP: (!) 154/81 (02/07/22 0800)  SpO2: 100 % (02/07/22 0813) Vital Signs (24h Range):  Temp:  [97.9 °F (36.6 °C)-98.9 °F (37.2 °C)] 98.6 °F (37 °C)  Pulse:  [] 93  Resp:  [17-37] 23  SpO2:  [98 %-100 %] 100 %  BP: (120-161)/(65-93) 154/81   Weight: 68 kg (150 lb)  Body mass index is 26.57 kg/m².      Intake/Output Summary (Last 24 hours) at 2/7/2022 0833  Last data filed at 2/7/2022 0701  Gross per 24 hour   Intake 1687.27 ml   Output 900 ml   Net 787.27 ml       Physical Exam    Vents:     Lines/Drains/Airways     Peripheral Intravenous Line                 Peripheral IV - Single Lumen 02/06/22 0546 20 G Right Antecubital 1 day         Peripheral IV - Single Lumen 02/06/22 2230 22 G Anterior;Right Forearm <1 day         Peripheral IV - Single Lumen 02/07/22 0127 Anterior;Right Upper Arm <1 day              Significant Labs:    CBC/Anemia Profile:  Recent Labs   Lab 02/06/22  0349 02/06/22  0459 02/06/22  0538 02/06/22 2028 02/07/22  0400   WBC 39.84*  --   --   --  27.39*   HGB 15.8   --   --   --  13.4   HCT 52.4* 49  --  30* 41.3   *  --   --   --  358   MCV 94  --   --   --  86   RDW 14.0  --   --   --  11.7   FERRITIN  --   --  102  --   --         Chemistries:  Recent Labs   Lab 02/06/22 0454 02/06/22  0454 02/06/22  1000 02/06/22  1906 02/07/22  0020   *   < > 128* 134* 132*   K 7.2*   < > 5.0 4.2 4.2   CL 93*   < > 98 104 101   CO2 <5*   < > 5* 13* 15*   BUN 29*   < > 28* 19 16   CREATININE 2.1*   < > 1.9* 1.3 1.2   CALCIUM 9.8   < > 9.7 10.0 10.0   ALBUMIN 4.6  --   --   --   --    PROT 8.7*  --   --   --   --    BILITOT 1.1*  --   --   --   --    ALKPHOS 153*  --   --   --   --    ALT 19  --   --   --   --    AST 21  --   --   --   --     < > = values in this interval not displayed.       Lactic Acid:   Recent Labs   Lab 02/06/22 0454 02/07/22  0400   LACTATE 5.4* 0.8     Urine Culture: No results for input(s): LABURIN in the last 48 hours.    Significant Imaging:  I have reviewed all pertinent imaging results/findings within the past 24 hours.

## 2022-02-07 NOTE — PLAN OF CARE
CMICU DAILY GOALS       A: Awake    RASS: Goal - RASS Goal: 0-->alert and calm  Actual - RASS (Simmons Agitation-Sedation Scale): 0-->alert and calm   Restraint necessity:    B: Breathe   SBT: Not intubated   C: Coordinate A & B, analgesics/sedatives   Pain: managed    SAT: Not intubated  D: Delirium   CAM-ICU: Overall CAM-ICU: Negative  E: Early(intubated/ Progressive (non-intubated) Mobility   MOVE Screen: Pass   Activity: Activity Management: Rolling - L1  FAS: Feeding/Nutrition   Diet order: Diet/Nutrition Received: clear liquid,    T: Thrombus   DVT prophylaxis: VTE Required Core Measure: Pharmacological prophylaxis initiated/maintained  H: HOB Elevation   Head of Bed (HOB) Positioning: HOB at 20-30 degrees  U: Ulcer Prophylaxis   GI: yes  G: Glucose control   managed Glycemic Management: blood glucose monitored  S: Skin      Device Skin Pressure Protection: absorbent pad utilized/changed,adhesive use limited,positioning supports utilized,skin-to-device areas padded,skin-to-skin areas padded,pressure points protected  Pressure Reduction Devices: elbow protectors utilized,foam padding utilized,heel offloading device utilized,positioning supports utilized,pressure-redistributing mattress utilized  Pressure Reduction Techniques: frequent weight shift encouraged,heels elevated off bed,pressure points protected,weight shift assistance provided  Skin Protection: adhesive use limited,incontinence pads utilized,protective footwear used,silicone foam dressing in place,skin-to-device areas padded,skin-to-skin areas padded,transparent dressing maintained,tubing/devices free from skin contact  B: Bowel Function   no issues   I: Indwelling Catheters   Schmidt necessity:     CVC necessity: No  D: De-escalation Antibiotics   Yes    Family/Goals of care/Code Status   Code Status: Full Code    24H Vital Sign Range  Temp:  [97.9 °F (36.6 °C)-98.9 °F (37.2 °C)]   Pulse:  []   Resp:  [17-37]   BP: (120-166)/(65-93)   SpO2:   [97 %-100 %]      Shift Events   No acute events throughout shift    VS and assessment per flow sheet, patient progressing towards goals as tolerated, plan of care reviewed with  Debra Duran and family , all concerns addressed, will continue to monitor.    Lucia Collins

## 2022-02-07 NOTE — HOSPITAL COURSE
Admitted to ICU for close monitoring of DKA and COVID. Asymptomatic from COVID perspective and no treatment initiated. Remains on insulin gtt. Electrolytes stable, anion gap closed. Endocrine consulted, appreciate recs. Transitioned back to home insulin pump on 2/7 as DKA resolved. Will follow-up with endocrinology in clinic on 2/14. Will provide prescription for Lantus 20 units SQ daily, Novolog to be administered as per pump parameters on discharge in case of pump failure.

## 2022-02-07 NOTE — ASSESSMENT & PLAN NOTE
Patient with Cr of 2.1 on admission with BUN of 29.   Likely prerenal in setting of persistent emesis and supports prerenal etiology.     Improving    Plan:  - No further evaluation  - Received fluid resuscitation   - Strict I's and O's  - Trend Cr

## 2022-02-07 NOTE — PLAN OF CARE
CMICU DAILY GOALS       A: Awake    RASS: Goal - RASS Goal: 0-->alert and calm  Actual - RASS (Simmons Agitation-Sedation Scale): 0-->alert and calm   Restraint necessity:    B: Breathe   SBT: Not intubated   C: Coordinate A & B, analgesics/sedatives   Pain: managed    SAT: Not intubated  D: Delirium   CAM-ICU: Overall CAM-ICU: Negative  E: Early(intubated/ Progressive (non-intubated) Mobility   MOVE Screen: Pass   Activity: Activity Management: Ambulated -L4,Ambulated in room - L4,Ambulated in burns - L4,Ambulated to bathroom - L4  FAS: Feeding/Nutrition   Diet order: Diet/Nutrition Received: clear liquid,consistent carb/diabetic diet,    T: Thrombus   DVT prophylaxis: VTE Required Core Measure: Pharmacological prophylaxis initiated/maintained  H: HOB Elevation   Head of Bed (HOB) Positioning: HOB elevated  U: Ulcer Prophylaxis   GI: no  G: Glucose control   managed Glycemic Management: blood glucose monitored  S: Skin     B: Bowel Function   no issues   I: Indwelling Catheters   Schmidt necessity:     CVC necessity: No  D: De-escalation Antibiotics   Yes    Family/Goals of care/Code Status   Code Status: Full Code    24H Vital Sign Range  Temp:  [97.9 °F (36.6 °C)-98.9 °F (37.2 °C)]   Pulse:  []   Resp:  [17-37]   BP: (120-161)/(65-93)   SpO2:  [98 %-100 %]      Shift Events   No acute events throughout shift. Line placed by NP Judy Hubbard. Sugars monitored and insulin gtt rate changed per [employer] instructions with sign offs. Notified MD DEO Varma of pt BG increasing through out night, though pt has not had nothing besides water to drink through out the evening.     VS and assessment per flow sheet, patient progressing towards goals as tolerated, plan of care reviewed with patient, concerns addressed, will continue to monitor.    Joanne Laughlin

## 2022-02-07 NOTE — PROGRESS NOTES
[facility] - [facility] ([place] [address])  Endocrinology  Progress Note    Admit Date: 2/6/2022     Reason for Consult: Management of T1DM, DKA    Diabetes diagnosis year: 2014    Home Diabetes Medications:  Per patient, settings, Medtronic 670 G, chronic settings greater than 1 year:  Basal 1.0 units/hour from 7AM till MN and 0.925 units/hr from MN to 7AM, ICR 1:10,  ISF 1:50    How often checking glucose at home?  Twice daily AM PM    BG readings on regimen: 150-200s  Hypoglycemia on the regimen?  No  Missed doses on regimen?  occ. Prandial bolus    Diabetes Complications include:   DKA      HPI:   Patient is a 27 y.o. female with a h/o T1D on Medtronic pump since 2014.  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  She began feeling lightheaded, nausea followed by emesis and abdominal discomfort.  She attempted to bolus after realizing her glucose increasing above 200 in did not recheck due to feeling ill.  She attempted fluid hydration with both Sprite and water.  She endorses prior compliance with her insulin pump checking infrequently only morning and night before bed typically running 180-2 0s and occasionally missing prandial insulin.  She recently moved from [place] a 1.5 years prior and has been attempting to establish with a local endocrinologist.  Denies prior episodes of DKA  Denies any known sick contacts, fever, night sweat, chill, polyuria, weight loss, polydipsia.    In ED, she was tachypnic and tachycardic  but otherwise afebrile and sating well RA. Lab significant for leukocytosis WBC 39, BS >500, Hydroxybutyrate 4.8, Istat K 7.3, lactic acid 5.4, HCO3 <5. She also found with FRANKY Cr 2.1 and positive for COVID. VBG with PH 6.93, pCO2 22. She was given a bolus of IV insuline and started on gtt and IVF with LR. Admitted on  "2/06 to MICU for management of severe DKA.     Endocrinology consulted for DKA believed to be precipitated by COVID infection versus acute gastritis    She has an upcoming appointment with endocrinology, Dr. Escalante, on 2/14/22        Interval HPI:   Overnight events:  No longer on DKA  Consider stopping her fluids  Titrating down her insulin requirements  Dexcom G6 placed on pt     Pt's  is to bring pump supplies to have pump placed tonight    Eating:   Clear liquid bariatric diet  Nausea: No  Hypoglycemia and intervention: No  Fever: No  TPN and/or TF: No  If yes, type of TF/TPN and rate: NA    /63 (BP Location: Left arm, Patient Position: Lying)   Pulse 100   Temp 98.2 °F (36.8 °C) (Oral)   Resp 18   Ht 5' 3" (1.6 m)   Wt 68 kg (149 lb 14.6 oz)   SpO2 99%   Breastfeeding No   BMI 26.56 kg/m²     Labs Reviewed and Include    Recent Labs   Lab 02/07/22  0400 02/07/22  0400 02/07/22  1024   *   < > 196*   CALCIUM 10.0   < > 9.9   ALBUMIN 4.0  --   --    PROT 7.4  --   --    *   < > 131*   K 3.6   < > 3.6   CO2 13*   < > 19*      < > 101   BUN 13   < > 7   CREATININE 1.2   < > 0.9   ALKPHOS 119  --   --    ALT 14  --   --    AST 15  --   --    BILITOT 1.0  --   --     < > = values in this interval not displayed.     Lab Results   Component Value Date    WBC 27.39 (H) 02/07/2022    HGB 13.4 02/07/2022    HCT 41.3 02/07/2022    MCV 86 02/07/2022     02/07/2022     No results for input(s): TSH, FREET4 in the last 168 hours.  Lab Results   Component Value Date    HGBA1C 12.0 (H) 02/06/2022       Nutritional status:   Body mass index is 26.56 kg/m².  Lab Results   Component Value Date    ALBUMIN 4.0 02/07/2022    ALBUMIN 4.6 02/06/2022     No results found for: PREALBUMIN    Estimated Creatinine Clearance: 86.1 mL/min (based on SCr of 0.9 mg/dL).    Accu-Checks  Recent Labs     02/07/22  0453 02/07/22  0559 02/07/22  0647 02/07/22  0732 02/07/22  0855 02/07/22  1024 " 02/07/22  1158 02/07/22  1315 02/07/22  1426 02/07/22  1428   POCTGLUCOSE 234* 248* 258* 260* 235* 218* 197* 253* >500* 278*       Current Medications and/or Treatments Impacting Glycemic Control  Immunotherapy:    Immunosuppressants     None        Steroids:   Hormones (From admission, onward)            None        Pressors:    Autonomic Drugs (From admission, onward)            None        Hyperglycemia/Diabetes Medications:   Antihyperglycemics (From admission, onward)            Start     Stop Route Frequency Ordered    02/06/22 0530  insulin regular in 0.9 % NaCl 100 unit/100 mL (1 unit/mL) infusion        Question Answer Comment   Insulin Rate Adjustment (DO NOT MODIFY ANSWER) \\[email or web address]\epic\Images\Pharmacy\InsulinInfusions\InsulinDKA [id number]    Enter initial dose from Infusion Protocol Chart (Units/hr): 3        -- IV Continuous 02/06/22 0421          ASSESSMENT and PLAN    * DKA (diabetic ketoacidosis)  Resolved      FRANKY (acute kidney injury)  Improving with fluids, avoid insulin stacking      COVID-19 virus infection  Respiratory status, stable  In COVID unit for COVID positive infection likely precipitating DKA    DM type 1, not at goal  Brief Hx:     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  Endorses compliance with home insulin pump denies any pump malfunction in proper storage of insulin.   Admitted for DKA    Consulted for:   DKA  DM type:  T1D on pump  A1C: 12.0  Hypoglycemia:  none  Steroids:   none  Diet/Tfs:    Bariatric clears  Glucose Goals:  140-180 mg/dL    Glucose Trend x 24hr:   Last     Home Regimen:    Medtronic 670 G with settings of basal 1.0 units/hour from 7AM till MN and 0.925 from MN till 7AM, carb ratio 1-10, ISF 1-50, been on the same settings for greater than 1 year     Attempting to establish with Endocrinology and has  upcoming appointment with Dr. Escalante on 02/14  Clinically improving  Insulin pump has been suspended while inpatient on intensive insulin drip    PLAN:  Pt no longer on DKA  Pt's  is to bring pump supplies to start the insulin pump  Once insulin pump running for 90 minutes can DC insulin drip  Resume diabetic diet once on insulin pump is in place and pt is to bolus based on insulin pump settings with 1 units for every 8 grams of carbs while she is sick. Once no longer sick can return to her regular profile of 1 units of insulin for every 10 grams of carbs.     POCT q1hr for now, will space out to q4hrs once insulin drip requirements drop and BG stable. Once on insulin pump POCT to be done before meals, bedtime and 2am.      Once pump in place, pt is to let nurse know how much insulin she will be bolusing as it needs to be documented.     Target BG while on pump is a BG of 120      Devante Cerda MD  Endocrinology  [facility] - Intensive Care ([place] [address])

## 2022-02-07 NOTE — SUBJECTIVE & OBJECTIVE
"Interval HPI:   Overnight events:  No longer on DKA  Consider stopping her fluids  Titrating down her insulin requirements  Dexcom G6 placed on pt     Pt's  is to bring pump supplies to have pump placed tonight    Eating:   Clear liquid bariatric diet  Nausea: No  Hypoglycemia and intervention: No  Fever: No  TPN and/or TF: No  If yes, type of TF/TPN and rate: NA    /63 (BP Location: Left arm, Patient Position: Lying)   Pulse 100   Temp 98.2 °F (36.8 °C) (Oral)   Resp 18   Ht 5' 3" (1.6 m)   Wt 68 kg (149 lb 14.6 oz)   SpO2 99%   Breastfeeding No   BMI 26.56 kg/m²     Labs Reviewed and Include    Recent Labs   Lab 02/07/22  0400 02/07/22  0400 02/07/22  1024   *   < > 196*   CALCIUM 10.0   < > 9.9   ALBUMIN 4.0  --   --    PROT 7.4  --   --    *   < > 131*   K 3.6   < > 3.6   CO2 13*   < > 19*      < > 101   BUN 13   < > 7   CREATININE 1.2   < > 0.9   ALKPHOS 119  --   --    ALT 14  --   --    AST 15  --   --    BILITOT 1.0  --   --     < > = values in this interval not displayed.     Lab Results   Component Value Date    WBC 27.39 (H) 02/07/2022    HGB 13.4 02/07/2022    HCT 41.3 02/07/2022    MCV 86 02/07/2022     02/07/2022     No results for input(s): TSH, FREET4 in the last 168 hours.  Lab Results   Component Value Date    HGBA1C 12.0 (H) 02/06/2022       Nutritional status:   Body mass index is 26.56 kg/m².  Lab Results   Component Value Date    ALBUMIN 4.0 02/07/2022    ALBUMIN 4.6 02/06/2022     No results found for: PREALBUMIN    Estimated Creatinine Clearance: 86.1 mL/min (based on SCr of 0.9 mg/dL).    Accu-Checks  Recent Labs     02/07/22  0453 02/07/22  0559 02/07/22  0647 02/07/22  0732 02/07/22  0855 02/07/22  1024 02/07/22  1158 02/07/22  1315 02/07/22  1426 02/07/22  1428   POCTGLUCOSE 234* 248* 258* 260* 235* 218* 197* 253* >500* 278*       Current Medications and/or Treatments Impacting Glycemic Control  Immunotherapy:    Immunosuppressants     None "        Steroids:   Hormones (From admission, onward)            None        Pressors:    Autonomic Drugs (From admission, onward)            None        Hyperglycemia/Diabetes Medications:   Antihyperglycemics (From admission, onward)            Start     Stop Route Frequency Ordered    02/06/22 0530  insulin regular in 0.9 % NaCl 100 unit/100 mL (1 unit/mL) infusion        Question Answer Comment   Insulin Rate Adjustment (DO NOT MODIFY ANSWER) \\[email or web address]\epic\Images\Pharmacy\InsulinInfusions\InsulinDKA [id number].pdf    Enter initial dose from Infusion Protocol Chart (Units/hr): 3        -- IV Continuous 02/06/22 8933

## 2022-02-07 NOTE — CONSULTS
Vancomycin order and consult has been discontinued. Pharmacy will sign off. Please re-consult if needed.     Thanks  Karen Ribera, PharmD  Ext. [phone]

## 2022-02-07 NOTE — ASSESSMENT & PLAN NOTE
COVID-19 testing:  Positive on 2/6/22. Vaccination 2x but not boosted  Isolation: Airborne/Droplet. Surgical mask on patient. Notify Infection Control  Diagnostics: Trend Q48hrs if stable, more frequently if patient decompensating       - CBC       - CMP       - Mg        - D-dimer       - Ferritin       - CRP       - CPK       - LDH       - Vitamin D       - BNP       - Troponin       - Procalcitonin       - Sputum Culture       - Blood Culture       - Urinalysis with reflex culture       - ECG       - CXR- demonstrated LLL opacity  Lymphopenia, hyponatremia, hyperferritinemia, elevated troponin, elevated d-dimer, age, and medical comorbidities are significant predictors of poor clinical outcome    Patient with presentation of DKA but also reports cough and SOB prior to admission. Patient with SpO2 >95 on RA but tachypnea associated with acidosis. COVID high risk evaluation scoring with 1 point for DM and there for will not initiate Rem or dex.     Leukocytosis improving  Lactate normalized    UA unremarkable   CXR unremarkable  Broad spectrum abx discontinued as no localizing source of infection  Not given Remdesivir or Dexamethasone 6mg PO/IV for 10 days as patient is on room air  Given lovenox for VTEp    Plan:  Follow-up with PCP  Re-present to hospital or seek medical attention if respiratory status deteriorates

## 2022-02-07 NOTE — PROCEDURES
"Debra Duran is a 27 y.o. female patient.    Temp: 98 °F (36.7 °C) (02/06/22 1640)  Pulse: (!) 112 (02/06/22 2100)  Resp: (!) 22 (02/06/22 2100)  BP: (!) 158/74 (02/06/22 2100)  SpO2: 99 % (02/06/22 2100)  Weight: 68 kg (150 lb) (02/06/22 0309)  Height: 5' 3" (160 cm) (02/06/22 0309)       Insert peripheral IV    Date/Time: 2/7/2022 1:27 AM  Performed by: Judy Hubbard NP  Authorized by: Judy Hubbard NP   Consent: Verbal consent obtained.  Risks and benefits: risks, benefits and alternatives were discussed  Consent given by: patient  Patient understanding: patient states understanding of the procedure being performed  Patient consent: the patient's understanding of the procedure matches consent given  Procedure consent: procedure consent matches procedure scheduled  Relevant documents: relevant documents present and verified  Test results: test results available and properly labeled  Site marked: the operative site was marked  Imaging studies: imaging studies available  Required items: required blood products, implants, devices, and special equipment available  Patient identity confirmed: verbally with patient, arm band and provided demographic data  Preparation: Patient was prepped and draped in the usual sterile fashion.  Local anesthesia used: no    Anesthesia:  Local anesthesia used: no    Sedation:  Patient sedated: no    Patient tolerance: patient tolerated the procedure well with no immediate complications  Comments: Nursing staff unable to obtain adequate PIV access.   5f, 10 cm PIV placed in right upper arm under ultrasound guidance using seldinger technique on initial attempt. IV placed under full sterile conditions. + blood return, flushes easily. Sutured in place.         Judy Hubbard NP  Critical Care Medicine    2/7/2022  "

## 2022-02-08 ENCOUNTER — TELEPHONE (OUTPATIENT)
Dept: ENDOCRINOLOGY | Facility: CLINIC | Age: 27
End: 2022-02-08
Payer: COMMERCIAL

## 2022-02-08 VITALS
DIASTOLIC BLOOD PRESSURE: 78 MMHG | HEIGHT: 63 IN | SYSTOLIC BLOOD PRESSURE: 136 MMHG | RESPIRATION RATE: 23 BRPM | WEIGHT: 149.94 LBS | BODY MASS INDEX: 26.57 KG/M2 | TEMPERATURE: 98 F | OXYGEN SATURATION: 100 % | HEART RATE: 83 BPM

## 2022-02-08 PROBLEM — E87.20 LACTIC ACIDOSIS: Status: RESOLVED | Noted: 2022-02-06 | Resolved: 2022-02-08

## 2022-02-08 PROBLEM — N17.9 AKI (ACUTE KIDNEY INJURY): Status: RESOLVED | Noted: 2022-02-06 | Resolved: 2022-02-08

## 2022-02-08 LAB
ALBUMIN SERPL BCP-MCNC: 3.3 G/DL (ref 3.5–5.2)
ALP SERPL-CCNC: 92 U/L (ref 55–135)
ALT SERPL W/O P-5'-P-CCNC: 12 U/L (ref 10–44)
ANION GAP SERPL CALC-SCNC: 11 MMOL/L (ref 8–16)
AST SERPL-CCNC: 13 U/L (ref 10–40)
BASOPHILS # BLD AUTO: 0.02 K/UL (ref 0–0.2)
BASOPHILS NFR BLD: 0.1 % (ref 0–1.9)
BILIRUB DIRECT SERPL-MCNC: 0.5 MG/DL (ref 0.1–0.3)
BILIRUB SERPL-MCNC: 1.5 MG/DL (ref 0.1–1)
BUN SERPL-MCNC: 6 MG/DL (ref 6–20)
CALCIUM SERPL-MCNC: 9.5 MG/DL (ref 8.7–10.5)
CHLORIDE SERPL-SCNC: 105 MMOL/L (ref 95–110)
CO2 SERPL-SCNC: 23 MMOL/L (ref 23–29)
CREAT SERPL-MCNC: 0.8 MG/DL (ref 0.5–1.4)
DIFFERENTIAL METHOD: ABNORMAL
EOSINOPHIL # BLD AUTO: 0 K/UL (ref 0–0.5)
EOSINOPHIL NFR BLD: 0.1 % (ref 0–8)
ERYTHROCYTE [DISTWIDTH] IN BLOOD BY AUTOMATED COUNT: 12.1 % (ref 11.5–14.5)
EST. GFR  (AFRICAN AMERICAN): >60 ML/MIN/1.73 M^2
EST. GFR  (NON AFRICAN AMERICAN): >60 ML/MIN/1.73 M^2
GLUCOSE SERPL-MCNC: 250 MG/DL (ref 70–110)
HCT VFR BLD AUTO: 36.7 % (ref 37–48.5)
HGB BLD-MCNC: 11.9 G/DL (ref 12–16)
IMM GRANULOCYTES # BLD AUTO: 0.08 K/UL (ref 0–0.04)
IMM GRANULOCYTES NFR BLD AUTO: 0.5 % (ref 0–0.5)
LYMPHOCYTES # BLD AUTO: 2.1 K/UL (ref 1–4.8)
LYMPHOCYTES NFR BLD: 13.6 % (ref 18–48)
MAGNESIUM SERPL-MCNC: 1.8 MG/DL (ref 1.6–2.6)
MCH RBC QN AUTO: 27.9 PG (ref 27–31)
MCHC RBC AUTO-ENTMCNC: 32.4 G/DL (ref 32–36)
MCV RBC AUTO: 86 FL (ref 82–98)
MONOCYTES # BLD AUTO: 1.1 K/UL (ref 0.3–1)
MONOCYTES NFR BLD: 7 % (ref 4–15)
NEUTROPHILS # BLD AUTO: 11.8 K/UL (ref 1.8–7.7)
NEUTROPHILS NFR BLD: 78.7 % (ref 38–73)
NRBC BLD-RTO: 0 /100 WBC
PHOSPHATE SERPL-MCNC: 2.2 MG/DL (ref 2.7–4.5)
PLATELET # BLD AUTO: 283 K/UL (ref 150–450)
PMV BLD AUTO: 10.5 FL (ref 9.2–12.9)
POCT GLUCOSE: 177 MG/DL (ref 70–110)
POCT GLUCOSE: 232 MG/DL (ref 70–110)
POTASSIUM SERPL-SCNC: 3.5 MMOL/L (ref 3.5–5.1)
PROT SERPL-MCNC: 6.6 G/DL (ref 6–8.4)
RBC # BLD AUTO: 4.26 M/UL (ref 4–5.4)
SODIUM SERPL-SCNC: 139 MMOL/L (ref 136–145)
WBC # BLD AUTO: 15.06 K/UL (ref 3.9–12.7)

## 2022-02-08 PROCEDURE — 80076 HEPATIC FUNCTION PANEL: CPT | Performed by: NURSE PRACTITIONER

## 2022-02-08 PROCEDURE — 83735 ASSAY OF MAGNESIUM: CPT | Performed by: NURSE PRACTITIONER

## 2022-02-08 PROCEDURE — 63600175 PHARM REV CODE 636 W HCPCS: Performed by: GENERAL ACUTE CARE HOSPITAL

## 2022-02-08 PROCEDURE — 85025 COMPLETE CBC W/AUTO DIFF WBC: CPT | Performed by: NURSE PRACTITIONER

## 2022-02-08 PROCEDURE — 84100 ASSAY OF PHOSPHORUS: CPT | Performed by: NURSE PRACTITIONER

## 2022-02-08 PROCEDURE — 99232 PR SUBSEQUENT HOSPITAL CARE,LEVL II: ICD-10-PCS | Mod: ,,, | Performed by: INTERNAL MEDICINE

## 2022-02-08 PROCEDURE — 99232 SBSQ HOSP IP/OBS MODERATE 35: CPT | Mod: ,,, | Performed by: INTERNAL MEDICINE

## 2022-02-08 PROCEDURE — 99238 PR HOSPITAL DISCHARGE DAY,<30 MIN: ICD-10-PCS | Mod: ,,, | Performed by: INTERNAL MEDICINE

## 2022-02-08 PROCEDURE — 80048 BASIC METABOLIC PNL TOTAL CA: CPT | Performed by: NURSE PRACTITIONER

## 2022-02-08 PROCEDURE — 25000003 PHARM REV CODE 250: Performed by: NURSE PRACTITIONER

## 2022-02-08 PROCEDURE — 25000003 PHARM REV CODE 250

## 2022-02-08 PROCEDURE — 94761 N-INVAS EAR/PLS OXIMETRY MLT: CPT

## 2022-02-08 PROCEDURE — 99238 HOSP IP/OBS DSCHRG MGMT 30/<: CPT | Mod: ,,, | Performed by: INTERNAL MEDICINE

## 2022-02-08 RX ORDER — PEN NEEDLE, DIABETIC 29 G X1/2"
30 NEEDLE, DISPOSABLE MISCELLANEOUS DAILY
Qty: 100 EACH | Refills: 0 | Status: SHIPPED | OUTPATIENT
Start: 2022-02-08

## 2022-02-08 RX ORDER — MAGNESIUM SULFATE HEPTAHYDRATE 40 MG/ML
2 INJECTION, SOLUTION INTRAVENOUS ONCE
Status: DISCONTINUED | OUTPATIENT
Start: 2022-02-08 | End: 2022-02-08

## 2022-02-08 RX ORDER — INSULIN DETEMIR 100 [IU]/ML
20 INJECTION, SOLUTION SUBCUTANEOUS DAILY
Qty: 6 ML | Refills: 3 | Status: SHIPPED | OUTPATIENT
Start: 2022-02-08 | End: 2023-02-08

## 2022-02-08 RX ORDER — SODIUM,POTASSIUM PHOSPHATES 280-250MG
2 POWDER IN PACKET (EA) ORAL EVERY 4 HOURS
Status: DISCONTINUED | OUTPATIENT
Start: 2022-02-08 | End: 2022-02-08 | Stop reason: HOSPADM

## 2022-02-08 RX ORDER — LANOLIN ALCOHOL/MO/W.PET/CERES
400 CREAM (GRAM) TOPICAL ONCE
Status: COMPLETED | OUTPATIENT
Start: 2022-02-08 | End: 2022-02-08

## 2022-02-08 RX ADMIN — HUMAN INSULIN 0.95 UNITS: 100 INJECTION, SOLUTION SUBCUTANEOUS at 03:02

## 2022-02-08 RX ADMIN — POTASSIUM & SODIUM PHOSPHATES POWDER PACK 280-160-250 MG 2 PACKET: 280-160-250 PACK at 10:02

## 2022-02-08 RX ADMIN — HUMAN INSULIN 0.95 UNITS: 100 INJECTION, SOLUTION SUBCUTANEOUS at 12:02

## 2022-02-08 RX ADMIN — HUMAN INSULIN 6.4 UNITS: 100 INJECTION, SOLUTION SUBCUTANEOUS at 09:02

## 2022-02-08 RX ADMIN — Medication 400 MG: at 10:02

## 2022-02-08 RX ADMIN — HUMAN INSULIN 1 UNITS: 100 INJECTION, SOLUTION SUBCUTANEOUS at 12:02

## 2022-02-08 RX ADMIN — MUPIROCIN: 20 OINTMENT TOPICAL at 09:02

## 2022-02-08 RX ADMIN — HUMAN INSULIN 1 UNITS: 100 INJECTION, SOLUTION SUBCUTANEOUS at 11:02

## 2022-02-08 RX ADMIN — HUMAN INSULIN 4.5 UNITS: 100 INJECTION, SOLUTION SUBCUTANEOUS at 11:02

## 2022-02-08 RX ADMIN — HUMAN INSULIN 1 UNITS: 100 INJECTION, SOLUTION SUBCUTANEOUS at 10:02

## 2022-02-08 RX ADMIN — HUMAN INSULIN 1 UNITS: 100 INJECTION, SOLUTION SUBCUTANEOUS at 08:02

## 2022-02-08 RX ADMIN — HUMAN INSULIN 1 UNITS: 100 INJECTION, SOLUTION SUBCUTANEOUS at 07:02

## 2022-02-08 RX ADMIN — HUMAN INSULIN 1 UNITS: 100 INJECTION, SOLUTION SUBCUTANEOUS at 01:02

## 2022-02-08 RX ADMIN — HUMAN INSULIN 0.95 UNITS: 100 INJECTION, SOLUTION SUBCUTANEOUS at 06:02

## 2022-02-08 RX ADMIN — HUMAN INSULIN 1 UNITS: 100 INJECTION, SOLUTION SUBCUTANEOUS at 09:02

## 2022-02-08 RX ADMIN — HUMAN INSULIN 0.95 UNITS: 100 INJECTION, SOLUTION SUBCUTANEOUS at 01:02

## 2022-02-08 RX ADMIN — HUMAN INSULIN 0.95 UNITS: 100 INJECTION, SOLUTION SUBCUTANEOUS at 04:02

## 2022-02-08 RX ADMIN — HUMAN INSULIN 1 UNITS: 100 INJECTION, SOLUTION SUBCUTANEOUS at 06:02

## 2022-02-08 RX ADMIN — HUMAN INSULIN 0.95 UNITS: 100 INJECTION, SOLUTION SUBCUTANEOUS at 02:02

## 2022-02-08 RX ADMIN — POTASSIUM & SODIUM PHOSPHATES POWDER PACK 280-160-250 MG 2 PACKET: 280-160-250 PACK at 06:02

## 2022-02-08 RX ADMIN — HUMAN INSULIN 1 UNITS: 100 INJECTION, SOLUTION SUBCUTANEOUS at 02:02

## 2022-02-08 RX ADMIN — HUMAN INSULIN 0.95 UNITS: 100 INJECTION, SOLUTION SUBCUTANEOUS at 05:02

## 2022-02-08 NOTE — DISCHARGE SUMMARY
"[facility] ([place] [address])  Critical Care Medicine  Discharge Summary      Patient Name: Debra Duran  MRN: [id number]  Admission Date: 2/6/2022  Hospital Length of Stay: 2 days  Discharge Date and Time:  02/08/2022 2:16 PM  Attending Physician: Rocío Powell MD   Discharging Provider: Judith Parkinson MD  Primary Care Provider: Melvi Savage MD  Reason for Admission: DKA    HPI:   Mr. Duran is 26 y/o with PMHx IDDMI diagnosed at age of 13 years on insuline pump Ha1c around 8. Who presented to ED for one day of nausea and vomiting. She reports started feeling nauseous yesterday afternoon when she was at work and felt weak. She went home has been feel nauseous and has been drinking Sprit and water. She have been vomiting and stared having b/l subcostal abdominal pain and flank pain. She reports feeling SOB and she thought she it is from " volume overload" because she has been drinking a lot of fluid to keep herself hydrated, however she never had any heart failure issues before. She fever, chills, cough, chest pain, diarrhea, skin rashes dysuria or urinary symptoms. 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 She reports good compliance with her insuline treatment and she has been on insuline pump since 2014. She never had a DKA before and never been admitted to the hospital for diabetes complications.     In ED, she was she looks ill and dry, she was tachypnic and tachycardic  but otherwise afebrile and sating well RA. Lab significant for leukocytosis WBC 39, BS >500, Hydroxybutyrate 4.8, Istat K 7.3, lactic acid 5.4, HCO3 <5. She also found with FRANKY Cr 2.1 and positive for COVID. VBG with PH 6.93, pCO2 22. EKG with sinus tach and elevated P wave. CXR with some atelectatic changes and some LLL opacity, but obvious consolidation. She was given a bolus of IV insuline and started on gtt and IVF with LR. She was give calcium gluconate. Pt will be admitted " to MICU for management of sever DKA.           * No surgery found *    Indwelling Lines/Drains at Time of Discharge:   Lines/Drains/Airways     None               Hospital Course:   Admitted to ICU for close monitoring of DKA and COVID. Asymptomatic from COVID perspective and no treatment initiated. Remains on insulin gtt. Electrolytes stable, anion gap closed. Endocrine consulted, appreciate recs. Transitioned back to home insulin pump on 2/7 as DKA resolved. Will follow-up with endocrinology in clinic on 2/14. Will provide prescription for Lantus 20 units SQ daily, Novolog to be administered as per pump parameters on discharge in case of pump failure.    Objective:     Vital Signs (Most Recent):   Temp: 98 °F (36.7 °C) (02/08/22 1100)   Pulse: 83 (02/08/22 1300)   Resp: 18 (02/08/22 1300)   BP: 136/78 (02/08/22 1200)   SpO2: 98 % (02/08/22 1300) Vital Signs (24h Range):   Temp: [98 °F (36.7 °C)-98.7 °F (37.1 °C)] 98 °F (36.7 °C)   Pulse: [] 83   Resp: [18-32] 18   SpO2: [95 %-100 %] 98 %   BP: (116-150)/(63-87) 136/78   Weight: 68 kg (149 lb 14.6 oz)   Body mass index is 26.56 kg/m².     Intake/Output Summary (Last 24 hours) at 2/8/2022 1415   Last data filed at 2/8/2022 0900       Gross per 24 hour   Intake 1132.9 ml   Output 1900 ml   Net -767.1 ml     Physical Exam   Vitals and nursing note reviewed.   Constitutional:   Appearance: Normal appearance.   HENT:   Head: Normocephalic and atraumatic.   Mouth/Throat:   Mouth: Mucous membranes are moist.   Eyes:   Extraocular Movements: Extraocular movements intact.   Pupils: Pupils are equal, round, and reactive to light.   Cardiovascular:   Rate and Rhythm: Normal rate and regular rhythm.   Pulses: Normal pulses.   Heart sounds: Normal heart sounds. No murmur heard.   Pulmonary:   Effort: Pulmonary effort is normal.   Breath sounds: No wheezing, rhonchi or rales.   Abdominal:   General: Bowel sounds are normal. There is no distension.   Palpations: Abdomen is  soft.   Tenderness: There is no abdominal tenderness.   Musculoskeletal:   Right lower leg: No edema.   Left lower leg: No edema.   Skin:   General: Skin is warm.   Capillary Refill: Capillary refill takes less than 2 seconds.   Findings: No erythema or rash.   Neurological:   General: No focal deficit present.   Mental Status: She is alert and oriented to person, place, and time.   Psychiatric:   Mood and Affect: Mood normal.   Thought Content: Thought content normal.         Lines/Drains/Airways               Peripheral Intravenous Line                       Peripheral IV - Single Lumen 02/06/22 0546 20 G Right Antecubital 2 days       Peripheral IV - Single Lumen 02/07/22 0127 Anterior;Right Upper Arm 1 day                  Significant Labs:   CBC/Anemia Profile:         Recent Labs   Lab 02/06/22 2028 02/07/22   0400 02/08/22   0241   WBC --  27.39* 15.06*   HGB --  13.4 11.9*   HCT 30* 41.3 36.7*   PLT --  358 283   MCV --  86 86   RDW --  11.7 12.1     Chemistries:           Recent Labs   Lab 02/07/22   0400 02/07/22   1024 02/07/22 2027 02/07/22   2251 02/08/22   0241   * < > 134* 133* 139   K 3.6 < > 3.7 3.5 3.5    < > 103 103 105   CO2 13* < > 20* 22* 23   BUN 13 < > 7 7 6   CREATININE 1.2 < > 1.0 0.9 0.8   CALCIUM 10.0 < > 8.8 8.9 9.5   ALBUMIN 4.0 --  --  --  3.3*   PROT 7.4 --  --  --  6.6   BILITOT 1.0 --  --  --  1.5*   ALKPHOS 119 --  --  --  92   ALT 14 --  --  --  12   AST 15 --  --  --  13   MG 1.7 --  --  --  1.8   PHOS 1.6* --  --  --  2.2*   < > = values in this interval not displayed.     Lactic Acid:       Recent Labs   Lab 02/07/22   0400   LACTATE 0.8     Urine Culture: No results for input(s): LABURIN in the last 48 hours.   Significant Imaging:   I have reviewed all pertinent imaging results/findings within the past 24 hours.  Last Subjective & Objective Note   Edited:   Judith Parkinson MD   2/7/2022 8:35 AM   Interval History/Significant Events: Remains on insulin gtt.  Blood glucose improved, electrolytes stable, gap closed. Started on clear liquid diet, will see how Pt tolerates it. Aim to transition back to subq insulin today and likely stepdown   Review of Systems   Constitutional: Negative for fatigue and fever.   HENT: Negative.   Eyes: Negative.   Respiratory: Negative for cough, shortness of breath and wheezing.   Cardiovascular: Negative for chest pain, palpitations and leg swelling.   Gastrointestinal: Positive for abdominal pain, nausea and vomiting. Negative for diarrhea.   Genitourinary: Negative for dysuria and hematuria.   Musculoskeletal: Negative.   Skin: Negative.   Neurological: Negative for dizziness, light-headedness and headaches.   Psychiatric/Behavioral: Negative.     Objective:     Vital Signs (Most Recent):   Temp: 98.6 °F (37 °C) (02/07/22 0701)   Pulse: 93 (02/07/22 0813)   Resp: (!) 23 (02/07/22 0813)   BP: (!) 154/81 (02/07/22 0800)   SpO2: 100 % (02/07/22 0813) Vital Signs (24h Range):   Temp: [97.9 °F (36.6 °C)-98.9 °F (37.2 °C)] 98.6 °F (37 °C)   Pulse: [] 93   Resp: [17-37] 23   SpO2: [98 %-100 %] 100 %   BP: (120-161)/(65-93) 154/81   Weight: 68 kg (150 lb)   Body mass index is 26.57 kg/m².     Intake/Output Summary (Last 24 hours) at 2/7/2022 0833   Last data filed at 2/7/2022 0701       Gross per 24 hour   Intake 1687.27 ml   Output 900 ml   Net 787.27 ml     Physical Exam   Vents:         Lines/Drains/Airways               Peripheral Intravenous Line                       Peripheral IV - Single Lumen 02/06/22 0546 20 G Right Antecubital 1 day       Peripheral IV - Single Lumen 02/06/22 2230 22 G Anterior;Right Forearm <1 day       Peripheral IV - Single Lumen 02/07/22 0127 Anterior;Right Upper Arm <1 day                  Significant Labs:   CBC/Anemia Profile:           Recent Labs   Lab 02/06/22   0349 02/06/22   0459 02/06/22   0538 02/06/22 2028 02/07/22   0400   WBC 39.84* --  --  --  27.39*   HGB 15.8 --  --  --  13.4   HCT 52.4*  49 --  30* 41.3   * --  --  --  358   MCV 94 --  --  --  86   RDW 14.0 --  --  --  11.7   FERRITIN --  --  102 --  --      Chemistries:           Recent Labs   Lab 02/06/22   0454 02/06/22   0454 02/06/22   1000 02/06/22   1906 02/07/22   0020   * < > 128* 134* 132*   K 7.2* < > 5.0 4.2 4.2   CL 93* < > 98 104 101   CO2 <5* < > 5* 13* 15*   BUN 29* < > 28* 19 16   CREATININE 2.1* < > 1.9* 1.3 1.2   CALCIUM 9.8 < > 9.7 10.0 10.0   ALBUMIN 4.6 --  --  --  --    PROT 8.7* --  --  --  --    BILITOT 1.1* --  --  --  --    ALKPHOS 153* --  --  --  --    ALT 19 --  --  --  --    AST 21 --  --  --  --    < > = values in this interval not displayed.     Lactic Acid:        Recent Labs   Lab 02/06/22   0454 02/07/22   0400   LACTATE 5.4* 0.8     Urine Culture: No results for input(s): LABURIN in the last 48 hours.   Significant Imaging:   I have reviewed all pertinent imaging results/findings within the past 24 hours.    Consults (From admission, onward)        Status Ordering Provider     Inpatient consult to Endocrinology  Once        Provider:  (Not yet assigned)    Completed EMMANUEL ISIDRO     Inpatient consult to Endocrinology  Once        Provider:  (Not yet assigned)    Completed DENISSE BURLESON     Inpatient consult to Critical Care Medicine  Once        Provider:  (Not yet assigned)    Completed TAWANNA RHOADES        Significant Labs:  A1C: No results for input(s): HGBA1C in the last 48 hours.  CMP:   Recent Labs   Lab 02/07/22  0400 02/07/22  1024 02/07/22  2027 02/07/22  2251 02/08/22  0241   *   < > 134* 133* 139   K 3.6   < > 3.7 3.5 3.5      < > 103 103 105   CO2 13*   < > 20* 22* 23   *   < > 369* 290* 250*   BUN 13   < > 7 7 6   CREATININE 1.2   < > 1.0 0.9 0.8   CALCIUM 10.0   < > 8.8 8.9 9.5   PROT 7.4  --   --   --  6.6   ALBUMIN 4.0  --   --   --  3.3*   BILITOT 1.0  --   --   --  1.5*   ALKPHOS 119  --   --   --  92   AST 15  --   --   --  13   ALT 14  --   --   --   12   ANIONGAP 16   < > 11 8 11   EGFRNONAA >60.0   < > >60.0 >60.0 >60.0    < > = values in this interval not displayed.     Urine Culture: No results for input(s): LABURIN in the last 48 hours.    Significant Imaging:  I have reviewed all pertinent imaging results/findings within the past 24 hours.    Pending Diagnostic Studies:     Procedure Component Value Units Date/Time    Basic Metabolic Panel [[id number]] Collected: 02/08/22 0759    Order Status: Sent Lab Status: In process Updated: 02/08/22 0759    Specimen: Blood     Vancomycin, random [[id number]] Collected: 02/06/22 1910    Order Status: Sent Lab Status: In process Updated: 02/06/22 1910    Specimen: Blood         Final Active Diagnoses:    Diagnosis Date Noted POA    PRINCIPAL PROBLEM:  DKA (diabetic ketoacidosis) [E11.10] 02/06/2022 Yes    COVID-19 virus infection [U07.1] 02/06/2022 Yes    DM type 1, not at goal [E10.9] 02/22/2021 Yes      Problems Resolved During this Admission:    Diagnosis Date Noted Date Resolved POA    FRANKY (acute kidney injury) [N17.9] 02/06/2022 02/08/2022 Yes    Lactic acidosis [E87.2] 02/06/2022 02/08/2022 Yes     Renal/  FRANKY (acute kidney injury)-resolved as of 2/8/2022  Patient with Cr of 2.1 on admission with BUN of 29.   Likely prerenal in setting of persistent emesis and supports prerenal etiology.     Improving    Plan:  - No further evaluation  - Received fluid resuscitation   - Strict I's and O's  - Trend Cr    Endocrine  * DKA (diabetic ketoacidosis)  Patient presents with BAUER/SOB, N/V and abdominal pain prior to admission concerning for DKA. Patient denies previous history of DKA and reports diabetes diagnosis in 2014 and currently uses insulin pump. Last A1C of 8. Patient denies any malfunction with pump or any other inciting factors for DKA but patient reports abdominal pain and emesis. On admission CXR demonstrates  LLL opacity and elevated WBC which could indicate COVID as inciting factor for DKA but patient  currently on RA with no significant symptoms and tachypnea likely associated with acidosis (pH of 6.9). Hyperkalemic on istat with K 7.3 from acidosis and no EKG changes. Na of 125 likely pseudohyponatremia in setting of BG >500.    Started on DKA pathway    Electrolytes stable  Ca gluconate given for cardiac stabilization  Q1h blood glucose, Q4h BMP  DKA RESOLVED    Plan:  Restarted on home insulin pump  DKA resolved, stable for discharge home  Endocrine consultation, appreciate recs  - will prescribe Lantus 20 units SQ daily, Novolog to be administered as per pump parameters in case the pump fails  Endocrine follow-up in clinic on 2/14      DM type 1, not at goal  See DKA    - Endocrine consult  - Diabetes education    Other  COVID-19 virus infection  COVID-19 testing:  Positive on 2/6/22. Vaccination 2x but not boosted  Isolation: Airborne/Droplet. Surgical mask on patient. Notify Infection Control  Diagnostics: Trend Q48hrs if stable, more frequently if patient decompensating       - CBC       - CMP       - Mg        - D-dimer       - Ferritin       - CRP       - CPK       - LDH       - Vitamin D       - BNP       - Troponin       - Procalcitonin       - Sputum Culture       - Blood Culture       - Urinalysis with reflex culture       - ECG       - CXR- demonstrated LLL opacity  Lymphopenia, hyponatremia, hyperferritinemia, elevated troponin, elevated d-dimer, age, and medical comorbidities are significant predictors of poor clinical outcome    Patient with presentation of DKA but also reports cough and SOB prior to admission. Patient with SpO2 >95 on RA but tachypnea associated with acidosis. COVID high risk evaluation scoring with 1 point for DM and there for will not initiate Rem or dex.     Leukocytosis improving  Lactate normalized    UA unremarkable   CXR unremarkable  Broad spectrum abx discontinued as no localizing source of infection  Not given Remdesivir or Dexamethasone 6mg PO/IV for 10 days as  patient is on room air  Given lovenox for VTEp    Plan:  Follow-up with PCP  Re-present to hospital or seek medical attention if respiratory status deteriorates        Discharged Condition: stable    Disposition:      Follow-up Information     Melvi Savage MD.    Specialty: Family Medicine  Contact information:  [address] [id number] [phone]                       Patient Instructions:   No discharge procedures on file.  Medications:  Reconciled Home Medications:      Medication List      START taking these medications    LEVEMIR FLEXTOUCH U-100 INSULN 100 unit/mL (3 mL) Inpn pen  Generic drug: insulin detemir U-100  Inject 20 Units into the skin once daily. To be administered only in the event of insulin pump failure        CONTINUE taking these medications    insulin aspart U-100 100 unit/mL injection  Commonly known as: NovoLOG  Inject 100 Units as directed Daily.     insulin aspart U-100 100 unit/mL injection  Commonly known as: NovoLOG U-100 Insulin aspart  Inject 100 units subcutaneously daily via Insulin pump     TRI-SPRINTEC (28) 0.18/0.215/0.25 mg-35 mcg (28) tablet  Generic drug: norgestimate-ethinyl estradioL  TAKE 1 TABLET BY MOUTH ONCE DAILY        STOP taking these medications    valACYclovir 1000 MG tablet  Commonly known as: VALTREX             Judith Parkinson MD  Critical Care Medicine  [facility] - Intensive Care ([place] [address])

## 2022-02-08 NOTE — PLAN OF CARE
Patient seen with housestaff team on morning rounds and then plan of care was discussed in detail.     27 year old woman admitted to ICU with DKA 2/2 COVID infection.      · IDDM in DKA:DKA resolved.  Patient has been followed closely by endocrine (assistance appreciated !).  Insulin pump restarted last night.  No overnight issues.  Endo is arranging short term outpatient follow up.  · COVID: no respiratory symptoms.  Spoke with employee health.  Patient is suitable for return to work on 2/12 (post 5 day quarantine)       Stable for discharge home.

## 2022-02-08 NOTE — TELEPHONE ENCOUNTER
----- Message from Breana Vargas MA sent at 2/8/2022  3:19 PM CST -----  Can you let the patient know I moved her appointment to Wednesday 2/16 at 9am for pump clinic per Dr. Escalante.  ----- Message -----  From: Katy Escalante MD  Sent: 2/8/2022   1:11 PM CST  To: Boris Burns Staff     Patient seen in hospital today, needs to establish care in pump clinic.  Can you change her upcoming visit on Monday with me to a Wednesday morning pump clinic visit next week?  Thank you

## 2022-02-08 NOTE — TELEPHONE ENCOUNTER
I explained to Mrs. Duran her appt was r/s to the pump clinic 2/16 , patient verbalized understanding.

## 2022-02-08 NOTE — SUBJECTIVE & OBJECTIVE
Interval History/Significant Events: Remains on insulin gtt. Blood glucose improved, electrolytes stable, gap closed. Started on clear liquid diet, will see how Pt tolerates it. Aim to transition back to subq insulin today and likely stepdown    Review of Systems   Constitutional: Negative for fatigue and fever.   HENT: Negative.    Eyes: Negative.    Respiratory: Negative for cough, shortness of breath and wheezing.    Cardiovascular: Negative for chest pain, palpitations and leg swelling.   Gastrointestinal: Negative for abdominal pain, diarrhea, nausea and vomiting.   Genitourinary: Negative for dysuria and hematuria.   Musculoskeletal: Negative.    Skin: Negative.    Neurological: Negative for dizziness, light-headedness and headaches.   Psychiatric/Behavioral: Negative.      Objective:     Vital Signs (Most Recent):  Temp: 98 °F (36.7 °C) (02/08/22 1100)  Pulse: 83 (02/08/22 1300)  Resp: 18 (02/08/22 1300)  BP: 136/78 (02/08/22 1200)  SpO2: 98 % (02/08/22 1300) Vital Signs (24h Range):  Temp:  [98 °F (36.7 °C)-98.7 °F (37.1 °C)] 98 °F (36.7 °C)  Pulse:  [] 83  Resp:  [18-32] 18  SpO2:  [95 %-100 %] 98 %  BP: (116-150)/(63-87) 136/78   Weight: 68 kg (149 lb 14.6 oz)  Body mass index is 26.56 kg/m².      Intake/Output Summary (Last 24 hours) at 2/8/2022 1415  Last data filed at 2/8/2022 0900  Gross per 24 hour   Intake 1132.9 ml   Output 1900 ml   Net -767.1 ml       Physical Exam  Vitals and nursing note reviewed.   Constitutional:       Appearance: Normal appearance.   HENT:      Head: Normocephalic and atraumatic.      Mouth/Throat:      Mouth: Mucous membranes are moist.   Eyes:      Extraocular Movements: Extraocular movements intact.      Pupils: Pupils are equal, round, and reactive to light.   Cardiovascular:      Rate and Rhythm: Normal rate and regular rhythm.      Pulses: Normal pulses.      Heart sounds: Normal heart sounds. No murmur heard.      Pulmonary:      Effort: Pulmonary effort is  normal.      Breath sounds: No wheezing, rhonchi or rales.   Abdominal:      General: Bowel sounds are normal. There is no distension.      Palpations: Abdomen is soft.      Tenderness: There is no abdominal tenderness.   Musculoskeletal:      Right lower leg: No edema.      Left lower leg: No edema.   Skin:     General: Skin is warm.      Capillary Refill: Capillary refill takes less than 2 seconds.      Findings: No erythema or rash.   Neurological:      General: No focal deficit present.      Mental Status: She is alert and oriented to person, place, and time.   Psychiatric:         Mood and Affect: Mood normal.         Thought Content: Thought content normal.         Vents:     Lines/Drains/Airways     Peripheral Intravenous Line                 Peripheral IV - Single Lumen 02/06/22 0546 20 G Right Antecubital 2 days         Peripheral IV - Single Lumen 02/07/22 0127 Anterior;Right Upper Arm 1 day              Significant Labs:    CBC/Anemia Profile:  Recent Labs   Lab 02/06/22 2028 02/07/22  0400 02/08/22  0241   WBC  --  27.39* 15.06*   HGB  --  13.4 11.9*   HCT 30* 41.3 36.7*   PLT  --  358 283   MCV  --  86 86   RDW  --  11.7 12.1        Chemistries:  Recent Labs   Lab 02/07/22  0400 02/07/22  1024 02/07/22 2027 02/07/22  2251 02/08/22  0241   *   < > 134* 133* 139   K 3.6   < > 3.7 3.5 3.5      < > 103 103 105   CO2 13*   < > 20* 22* 23   BUN 13   < > 7 7 6   CREATININE 1.2   < > 1.0 0.9 0.8   CALCIUM 10.0   < > 8.8 8.9 9.5   ALBUMIN 4.0  --   --   --  3.3*   PROT 7.4  --   --   --  6.6   BILITOT 1.0  --   --   --  1.5*   ALKPHOS 119  --   --   --  92   ALT 14  --   --   --  12   AST 15  --   --   --  13   MG 1.7  --   --   --  1.8   PHOS 1.6*  --   --   --  2.2*    < > = values in this interval not displayed.       Lactic Acid:   Recent Labs   Lab 02/07/22  0400   LACTATE 0.8     Urine Culture: No results for input(s): LABURIN in the last 48 hours.    Significant Imaging:  I have reviewed all  pertinent imaging results/findings within the past 24 hours.

## 2022-02-08 NOTE — PLAN OF CARE
Patient is AAO X 4. Patient is on room air and has transitioned from insulin IV gtts to insulin home pump. Rate on home pump is currently scheduled to deliver 0.953 units of insulin per hour. Patient is able to ambulate to bathroom without assistant. VS and assessment per flow sheet, patient progressing towards goals as tolerated, plan of care reviewed with patient, all concerns addressed, will continue plan of care per provider orders.    Problem: Adult Inpatient Plan of Care  Goal: Plan of Care Review  Outcome: Ongoing, Progressing  Flowsheets (Taken 2/8/2022 0541)  Plan of Care Reviewed With: patient  Goal: Patient-Specific Goal (Individualized)  Outcome: Ongoing, Progressing  Flowsheets (Taken 2/8/2022 0541)  Anxieties, Fears or Concerns: concern about creatinine levels trending down  Individualized Care Needs: verbalized all  blood glucose levels to pt per request  Patient-Specific Goals (Include Timeframe): administered Levonox injection to prevent DVT, mupricion ointment administered in left and right nostril to prevent skin breakdown  Goal: Readiness for Transition of Care  Outcome: Ongoing, Progressing     Problem: Diabetes Comorbidity  Goal: Blood Glucose Level Within Targeted Range  Outcome: Ongoing, Progressing     Problem: Oral Intake Inadequate (Acute Kidney Injury/Impairment)  Goal: Optimal Nutrition Intake  Outcome: Ongoing, Progressing     CMICU DAILY GOALS       A: Awake    RASS: Goal - RASS Goal: 0-->alert and calm  Actual - RASS (Simmons Agitation-Sedation Scale): 0-->alert and calm   Restraint necessity:    B: Breathe   SBT: Not intubated   C: Coordinate A & B, analgesics/sedatives   Pain: managed    SAT: Not intubated  D: Delirium   CAM-ICU: Overall CAM-ICU: Negative  E: Early(intubated/ Progressive (non-intubated) Mobility   MOVE Screen: Pass   Activity: Activity Management: Ambulated to bathroom - L4  FAS: Feeding/Nutrition   Diet order: Diet/Nutrition Received: consistent carb/diabetic  diet,    T: Thrombus   DVT prophylaxis: VTE Required Core Measure: Pharmacological prophylaxis initiated/maintained  H: HOB Elevation   Head of Bed (HOB) Positioning: HOB at 30-45 degrees  U: Ulcer Prophylaxis   GI: no  G: Glucose control   managed Glycemic Management: blood glucose monitored,supplemental insulin given  S: Skin   Bathing/Skin Care: bath, complete,dressed/undressed,incontinence care,linen changed  Device Skin Pressure Protection: absorbent pad utilized/changed,adhesive use limited,skin-to-skin areas padded  Pressure Reduction Devices: pressure-redistributing mattress utilized,specialty bed utilized,foam padding utilized  Pressure Reduction Techniques: frequent weight shift encouraged  Skin Protection: adhesive use limited,incontinence pads utilized,skin-to-skin areas padded,transparent dressing maintained,tubing/devices free from skin contact  B: Bowel Function   no issues   I: Indwelling Catheters   Schmidt necessity:     CVC necessity: No  D: De-escalation Antibiotics   ABX per MAR    Family/Goals of care/Code Status   Code Status: Full Code    24H Vital Sign Range  Temp:  [98.2 °F (36.8 °C)-98.7 °F (37.1 °C)]   Pulse:  []   Resp:  [18-32]   BP: (116-166)/(63-87)   SpO2:  [95 %-100 %]

## 2022-02-08 NOTE — PLAN OF CARE
[facility] - [facility] ([facility]-)  Discharge Final Note    Primary Care Provider: Melvi Savage MD    Expected Discharge Date: 2/8/2022    Future Appointments   Date Time Provider Department Center   2/16/2022  9:00 AM [facility]   2/22/2022  4:00 PM Melvi Savage MD [facility]           Final Discharge Note (most recent)     Final Note - 02/08/22 1655        Final Note    Assessment Type Final Discharge Note     Anticipated Discharge Disposition Home or Self Care     What phone number can be called within the next 1-3 days to see how you are doing after discharge? [phone]     Hospital Resources/Appts/Education Provided Provided patient/caregiver with written discharge plan information               Ladonna Seay LMSW  [facility]  X [id number]        Important Message from Medicare             Contact Info     Melvi Savage MD   Specialty: Family Medicine   Relationship: PCP - General    [address]  [place] [address]   Phone: [phone]       Next Steps: Follow up

## 2022-02-08 NOTE — ASSESSMENT & PLAN NOTE
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  Endorses compliance with home insulin pump denies any pump malfunction in proper storage of insulin.   Admitted for DKA    Consulted for:   DKA  DM type:  T1D on pump  A1C: 12.0  Hypoglycemia:  none  Steroids:   none  Diet/Tfs:    Bariatric clears  Glucose Goals:  140-180 mg/dL    Glucose Trend x 24hr:   Last  (BG down trended overnight)    Home Regimen:    Medtronic 670 G with settings of basal 1.0 units/hour from 7AM till MN and 0.925 from MN till 7AM, carb ratio 1-10, ISF 1-50, been on the same settings for greater than 1 year     Attempting to establish with Endocrinology and has upcoming appointment with Dr. Escalante on 02/14  Clinically improving    PLAN:  Pt no longer on DKA  Patient's  brought all the insulin supplies last night  Insulin pump was placed  Patient is to continue using insulin pump and bolus as per settings and the pump  Continue diabetic diet    Pt is to bolus based on insulin pump settings with 1 units for every 8 grams of carbs while she is sick. Once no longer sick can return to her regular profile of 1 units of insulin for every 10 grams of carbs.     POCT before meals bedtime and 2:00 a.m. to while inpatient      Once pump in place, pt is to let nurse know how much insulin she will be bolusing as it needs to be documented.

## 2022-02-08 NOTE — SUBJECTIVE & OBJECTIVE
"Interval HPI:   Overnight events:  No longer on DKA  Continue insulin pump      Eating:   Diabetc diet  Nausea: No  Hypoglycemia and intervention: No  Fever: No  TPN and/or TF: No  If yes, type of TF/TPN and rate: NA    BP (!) 150/82 (BP Location: Left arm, Patient Position: Lying)   Pulse 92   Temp 98.7 °F (37.1 °C) (Axillary)   Resp (!) 22   Ht 5' 3" (1.6 m)   Wt 68 kg (149 lb 14.6 oz)   SpO2 97%   Breastfeeding No   BMI 26.56 kg/m²     Labs Reviewed and Include    Recent Labs   Lab 02/08/22  0241   *   CALCIUM 9.5   ALBUMIN 3.3*   PROT 6.6      K 3.5   CO2 23      BUN 6   CREATININE 0.8   ALKPHOS 92   ALT 12   AST 13   BILITOT 1.5*     Lab Results   Component Value Date    WBC 15.06 (H) 02/08/2022    HGB 11.9 (L) 02/08/2022    HCT 36.7 (L) 02/08/2022    MCV 86 02/08/2022     02/08/2022     No results for input(s): TSH, FREET4 in the last 168 hours.  Lab Results   Component Value Date    HGBA1C 12.0 (H) 02/06/2022       Nutritional status:   Body mass index is 26.56 kg/m².  Lab Results   Component Value Date    ALBUMIN 3.3 (L) 02/08/2022    ALBUMIN 4.0 02/07/2022    ALBUMIN 4.6 02/06/2022     No results found for: PREALBUMIN    Estimated Creatinine Clearance: 96.9 mL/min (based on SCr of 0.8 mg/dL).    Accu-Checks  Recent Labs     02/07/22  0453 02/07/22  0559 02/07/22  0647 02/07/22  0732 02/07/22  0855 02/07/22  1024 02/07/22  1158 02/07/22  1315 02/07/22  1426 02/07/22  1428   POCTGLUCOSE 234* 248* 258* 260* 235* 218* 197* 253* >500* 278*       Current Medications and/or Treatments Impacting Glycemic Control  Immunotherapy:    Immunosuppressants     None        Steroids:   Hormones (From admission, onward)            None        Pressors:    Autonomic Drugs (From admission, onward)            None        Hyperglycemia/Diabetes Medications:   Antihyperglycemics (From admission, onward)            Start     Stop Route Frequency Ordered    02/08/22 0645  insulin regular insulin " pump from home 1 Units        Question Answer Comment   Target number 120    Carbohydrate coverage #1 8    Carbohydrate coverage #1 time MN to MN    Sensitivity #1 1:50    Sensitivity #1 time MN to MN        -- SubQ 3 times daily before meals 02/07/22 1814 02/07/22 1915  insulin regular insulin pump from home 1 Units        Question Answer Comment   Target number 120    Basal Rate #1 1    Basal rate #1 time 7AM to MN    Basal Rate #2 0.925    Basal rate #2 time MN to 7AM        -- SubQ Continuous 02/07/22 1819

## 2022-02-08 NOTE — PLAN OF CARE
[facility] - Intensive [facility] ([place] [address])  Initial Discharge Assessment       Primary Care Provider: Melvi Savage MD    Admission Diagnosis: Tachycardia [R00.0]  Hyperglycemia [R73.9]    Admission Date: 2/6/2022  Expected Discharge Date:     Discharge Barriers Identified: None    Payor: BLUE CROSS [employer] BENEFIT / Plan: [employer] [place]UE [place]OSS [place] / Product Type: Self Funded /     Extended Emergency Contact Information  Primary Emergency Contact: jack faulkner  Mobile Phone: [phone]  Relation: Mother  Secondary Emergency Contact: Krish Ventura  Mobile Phone: [phone]  Relation: Spouse  Preferred language: English   needed? No    Discharge Plan A: Home  Discharge Plan B: Home with family      [facility]  [address]  [facility] [address]  Phone: [phone] Fax: [phone]    CM spoke with patient in room for Discharge Planning Assessment. Patient was able to answer questions. Per patient, she lives with Krish Duran (spouse) [phone] in a 2nd floor apartment with 12 steps up to apartment, and 0 steps to enter. Per patient, she was independent with ADLs and used no DME for ambulation. Per patient, she is not on dialysis and does not take Coumadin. Patient will have assistance from Krish Duran (spouse) [phone] upon discharge. Discharge Planning discussed with patient. All questions addressed. CM to follow for further needs.    Initial Assessment (most recent)     Adult Discharge Assessment - 02/08/22 1004        Discharge Assessment    Assessment Type Discharge Planning Assessment     Confirmed/corrected address, phone number and insurance Yes     Confirmed Demographics Correct on Facesheet     Source of Information patient     When was your last doctors appointment? 12/16/21     Communicated TIFFANIE with patient/caregiver Date not available/Unable to determine     Reason For Admission Diabetic ketoacidosis     Lives With spouse     Facility  Arrived From: Home     Do you expect to return to your current living situation? Yes     Do you have help at home or someone to help you manage your care at home? Yes     Who are your caregiver(s) and their phone number(s)? Krish Duran (spouse) [phone]     Prior to hospitilization cognitive status: Alert/Oriented     Current cognitive status: Alert/Oriented     Walking or Climbing Stairs Difficulty none     Dressing/Bathing Difficulty none     Equipment Currently Used at Home none     Readmission within 30 days? No     Patient currently being followed by outpatient case management? No     Do you currently have service(s) that help you manage your care at home? No     Do you take prescription medications? Yes     Do you have prescription coverage? Yes     Coverage [employer] Benefit - [facility] [employer] [place]     Do you have any problems affording any of your prescribed medications? No     Who is going to help you get home at discharge? Krish Duran (spouse) [phone]     How do you get to doctors appointments? car, drives self     Are you on dialysis? No     Do you take coumadin? No     Discharge Plan A Home     Discharge Plan B Home with family     DME Needed Upon Discharge  other (see comments)   TBD    Discharge Plan discussed with: Patient     Discharge Barriers Identified None        Relationship/Environment    Name(s) of Who Lives With Patient Krish Duran (spouse) [phone]                        PCP:  Melvi Savage MD  [phone]        Pharmacy:    [facility] [address] [place] [address]  Phone: [phone] Fax: [phone]        Emergency Contacts:  Extended Emergency Contact Information  Primary Emergency Contact: jack faulkner  Mobile Phone: [phone]  Relation: Mother  Secondary Emergency Contact: Krish Vent[place]  Mobile Phone: [phone]  Relation: Spouse  Preferred language: English   needed?  No      Insurance:    Payor: BLUE CROSS [facility] EMPLOYEE BENEFIT / [facility] [place] / Product Type: Self Funded /     Татьяна Melendez RN     [phone]      02/08/2022  10:12 AM

## 2022-02-08 NOTE — PLAN OF CARE
Notified by RN about glucose of 420 and 380 on recheck. Last Endo note mentions ISF of 50:1. There is no goal glucose mentioned, but I instructed RN to tell patient to take 5u bolus via insulin pump. This will theoretically bring glucose down to 130 over the next few hours. Continue to monitor    Johann Foreman MD  Cardiovascular Disease Fellow PGY V  Pager [phone]

## 2022-02-08 NOTE — PROGRESS NOTES
[facility] - Intensive [facility] ([place] [address])  Endocrinology  Progress Note    Admit Date: 2/6/2022     Reason for Consult: Management of T1DM, DKA    Diabetes diagnosis year: 2014    Home Diabetes Medications:  Per patient, settings, Medtronic 670 G, chronic settings greater than 1 year:  Basal 1.0 units/hour from 7AM till MN and 0.975 units/hr from MN to 7AM, ICR 1:10,  ISF 1:50    How often checking glucose at home?  Twice daily AM PM    BG readings on regimen: 150-200s  Hypoglycemia on the regimen?  No  Missed doses on regimen?  occ. Prandial bolus    Diabetes Complications include:   DKA      HPI:   Patient is a 27 y.o. female with a h/o T1D on Medtronic pump since 2014.  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  She began feeling lightheaded, nausea followed by emesis and abdominal discomfort.  She attempted to bolus after realizing her glucose increasing above 200 in did not recheck due to feeling ill.  She attempted fluid hydration with both Sprite and water.  She endorses prior compliance with her insulin pump checking infrequently only morning and night before bed typically running 180-2 0s and occasionally missing prandial insulin.  She recently moved from [place] a 1.5 years prior and has been attempting to establish with a local endocrinologist.  Denies prior episodes of DKA  Denies any known sick contacts, fever, night sweat, chill, polyuria, weight loss, polydipsia.    In ED, she was tachypnic and tachycardic  but otherwise afebrile and sating well RA. Lab significant for leukocytosis WBC 39, BS >500, Hydroxybutyrate 4.8, Istat K 7.3, lactic acid 5.4, HCO3 <5. She also found with FRANKY Cr 2.1 and positive for COVID. VBG with PH 6.93, pCO2 22. She was given a bolus of IV insuline and started on gtt and IVF with LR. Admitted on  "2/06 to MICU for management of severe DKA.     Endocrinology consulted for DKA believed to be precipitated by COVID infection versus acute gastritis    She has an upcoming appointment with endocrinology, Dr. Escalante, on 2/14/22        Interval HPI:   Overnight events:  No longer on DKA  Continue insulin pump    Noted to have elevated BG last night, but per pt she wanted to give a correction but night nurse did not let her do it. Pt will like to know if she can get an appointment for Thursday (afternoon) or Friday better. If not she will keep her Monday appointment.       Eating:   Diabetc diet  Nausea: No  Hypoglycemia and intervention: No  Fever: No  TPN and/or TF: No  If yes, type of TF/TPN and rate: NA    BP (!) 150/82 (BP Location: Left arm, Patient Position: Lying)   Pulse 92   Temp 98.7 °F (37.1 °C) (Axillary)   Resp (!) 22   Ht 5' 3" (1.6 m)   Wt 68 kg (149 lb 14.6 oz)   SpO2 97%   Breastfeeding No   BMI 26.56 kg/m²     Labs Reviewed and Include    Recent Labs   Lab 02/08/22  0241   *   CALCIUM 9.5   ALBUMIN 3.3*   PROT 6.6      K 3.5   CO2 23      BUN 6   CREATININE 0.8   ALKPHOS 92   ALT 12   AST 13   BILITOT 1.5*     Lab Results   Component Value Date    WBC 15.06 (H) 02/08/2022    HGB 11.9 (L) 02/08/2022    HCT 36.7 (L) 02/08/2022    MCV 86 02/08/2022     02/08/2022     No results for input(s): TSH, FREET4 in the last 168 hours.  Lab Results   Component Value Date    HGBA1C 12.0 (H) 02/06/2022       Nutritional status:   Body mass index is 26.56 kg/m².  Lab Results   Component Value Date    ALBUMIN 3.3 (L) 02/08/2022    ALBUMIN 4.0 02/07/2022    ALBUMIN 4.6 02/06/2022     No results found for: PREALBUMIN    Estimated Creatinine Clearance: 96.9 mL/min (based on SCr of 0.8 mg/dL).    Accu-Checks  Recent Labs     02/07/22  0453 02/07/22  0559 02/07/22  0647 02/07/22  0732 02/07/22  0855 02/07/22  1024 02/07/22  1158 02/07/22  1315 02/07/22  1426 02/07/22  1428   POCTGLUCOSE " 234* 248* 258* 260* 235* 218* 197* 253* >500* 278*       Current Medications and/or Treatments Impacting Glycemic Control  Immunotherapy:    Immunosuppressants     None        Steroids:   Hormones (From admission, onward)            None        Pressors:    Autonomic Drugs (From admission, onward)            None        Hyperglycemia/Diabetes Medications:   Antihyperglycemics (From admission, onward)            Start     Stop Route Frequency Ordered    02/08/22 0645  insulin regular insulin pump from home 1 Units        Question Answer Comment   Target number 120    Carbohydrate coverage #1 8    Carbohydrate coverage #1 time MN to MN    Sensitivity #1 1:50    Sensitivity #1 time MN to MN        -- SubQ 3 times daily before meals 02/07/22 1814    02/07/22 1915  insulin regular insulin pump from home 1 Units        Question Answer Comment   Target number 120    Basal Rate #1 1    Basal rate #1 time 7AM to MN    Basal Rate #2 0.925    Basal rate #2 time MN to 7AM        -- SubQ Continuous 02/07/22 1819          ASSESSMENT and PLAN    * DKA (diabetic ketoacidosis)  Resolved      COVID-19 virus infection  Respiratory status, stable  In COVID unit for COVID positive infection likely precipitating DKA    DM type 1, not at goal  Brief Hx:     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  Endorses compliance with home insulin pump denies any pump malfunction in proper storage of insulin.   Admitted for DKA    Consulted for:   DKA  DM type:  T1D on pump  A1C: 12.0  Hypoglycemia:  none  Steroids:   none  Diet/Tfs:    Bariatric clears  Glucose Goals:  140-180 mg/dL    Glucose Trend x 24hr:   Last  (BG down trended overnight)    Home Regimen:    Medtronic 670 G with settings of basal 1.0 units/hour from 7AM till MN and 0.925 from MN till 7AM, carb ratio 1-10, ISF 1-50, been on the same  settings for greater than 1 year     Attempting to establish with Endocrinology and has upcoming appointment with Dr. Escalante on 02/14  Clinically improving    PLAN:  Pt no longer on DKA  Patient's  brought all the insulin supplies last night  Insulin pump was placed  Patient is to continue using insulin pump and bolus as per settings and the pump  Continue diabetic diet    Pt is to bolus based on insulin pump settings with 1 units for every 8 grams of carbs while she is sick. Once no longer sick can return to her regular profile of 1 units of insulin for every 10 grams of carbs.     POCT before meals bedtime and 2:00 a.m. to while inpatient      Once pump in place, pt is to let nurse know how much insulin she will be bolusing as it needs to be documented.     -Please provide pt with insulin pens (and pen needles) prescription for back up plan, in case insulin pump malfunctions:  Lantus 20 units SQ daily, Novolog to be administered as per pump parameters.         Devante Cerda MD  Endocrinology  [facility] - Intensive Care ([place] [address])

## 2022-02-11 LAB
BACTERIA BLD CULT: NORMAL
BACTERIA BLD CULT: NORMAL

## 2022-02-15 LAB
POCT GLUCOSE: 229 MG/DL (ref 70–110)
POCT GLUCOSE: 231 MG/DL (ref 70–110)
POCT GLUCOSE: 233 MG/DL (ref 70–110)
POCT GLUCOSE: 235 MG/DL (ref 70–110)
POCT GLUCOSE: 247 MG/DL (ref 70–110)
POCT GLUCOSE: 267 MG/DL (ref 70–110)
POCT GLUCOSE: 276 MG/DL (ref 70–110)
POCT GLUCOSE: 277 MG/DL (ref 70–110)
POCT GLUCOSE: 280 MG/DL (ref 70–110)
POCT GLUCOSE: 284 MG/DL (ref 70–110)
POCT GLUCOSE: 366 MG/DL (ref 70–110)
POCT GLUCOSE: 383 MG/DL (ref 70–110)
POCT GLUCOSE: 413 MG/DL (ref 70–110)

## 2022-02-16 ENCOUNTER — OFFICE VISIT (OUTPATIENT)
Dept: ENDOCRINOLOGY | Facility: CLINIC | Age: 27
End: 2022-02-16
Payer: COMMERCIAL

## 2022-02-16 VITALS
DIASTOLIC BLOOD PRESSURE: 98 MMHG | HEART RATE: 87 BPM | WEIGHT: 166.31 LBS | OXYGEN SATURATION: 99 % | HEIGHT: 63 IN | SYSTOLIC BLOOD PRESSURE: 138 MMHG | BODY MASS INDEX: 29.47 KG/M2

## 2022-02-16 DIAGNOSIS — E66.3 OVERWEIGHT (BMI 25.0-29.9): ICD-10-CM

## 2022-02-16 DIAGNOSIS — Z96.41 INSULIN PUMP IN PLACE: ICD-10-CM

## 2022-02-16 DIAGNOSIS — E10.9: Primary | ICD-10-CM

## 2022-02-16 PROCEDURE — 1111F DSCHRG MED/CURRENT MED MERGE: CPT | Mod: CPTII,S$GLB,, | Performed by: INTERNAL MEDICINE

## 2022-02-16 PROCEDURE — 1111F PR DISCHARGE MEDS RECONCILED W/ CURRENT OUTPATIENT MED LIST: ICD-10-PCS | Mod: CPTII,S$GLB,, | Performed by: INTERNAL MEDICINE

## 2022-02-16 PROCEDURE — 95251 PR GLUCOSE MONITOR, 72 HOUR, PHYS INTERP: ICD-10-PCS | Mod: S$GLB,,, | Performed by: INTERNAL MEDICINE

## 2022-02-16 PROCEDURE — 99214 OFFICE O/P EST MOD 30 MIN: CPT | Mod: 25,S$GLB,, | Performed by: INTERNAL MEDICINE

## 2022-02-16 PROCEDURE — 99214 PR OFFICE/OUTPT VISIT, EST, LEVL IV, 30-39 MIN: ICD-10-PCS | Mod: 25,S$GLB,, | Performed by: INTERNAL MEDICINE

## 2022-02-16 PROCEDURE — 95251 CONT GLUC MNTR ANALYSIS I&R: CPT | Mod: S$GLB,,, | Performed by: INTERNAL MEDICINE

## 2022-02-16 PROCEDURE — 99999 PR PBB SHADOW E&M-EST. PATIENT-LVL IV: CPT | Mod: PBBFAC,,,

## 2022-02-16 PROCEDURE — 99999 PR PBB SHADOW E&M-EST. PATIENT-LVL IV: ICD-10-PCS | Mod: PBBFAC,,,

## 2022-02-16 RX ORDER — INSULIN PUMP SYRINGE, 3 ML
EACH MISCELLANEOUS
Qty: 1 EACH | Refills: 0 | Status: SHIPPED | OUTPATIENT
Start: 2022-02-16

## 2022-02-16 RX ORDER — BLOOD-GLUCOSE,RECEIVER,CONT
1 EACH MISCELLANEOUS CONTINUOUS
Qty: 1 EACH | Refills: 0 | Status: SHIPPED | OUTPATIENT
Start: 2022-02-16 | End: 2023-02-16

## 2022-02-16 RX ORDER — BLOOD-GLUCOSE TRANSMITTER
1 EACH MISCELLANEOUS CONTINUOUS
Qty: 1 EACH | Refills: 3 | Status: SHIPPED | OUTPATIENT
Start: 2022-02-16 | End: 2023-02-16

## 2022-02-16 RX ORDER — LANCETS 28 GAUGE
EACH MISCELLANEOUS
Qty: 200 EACH | Refills: 11 | Status: SHIPPED | OUTPATIENT
Start: 2022-02-16

## 2022-02-16 RX ORDER — VALACYCLOVIR HYDROCHLORIDE 1 G/1
1000 TABLET, FILM COATED ORAL 2 TIMES DAILY
COMMUNITY

## 2022-02-16 RX ORDER — BLOOD-GLUCOSE SENSOR
3 EACH MISCELLANEOUS CONTINUOUS
Qty: 3 EACH | Refills: 12 | Status: SHIPPED | OUTPATIENT
Start: 2022-02-16 | End: 2023-02-16

## 2022-02-16 NOTE — ASSESSMENT & PLAN NOTE
Diet and exercise recommended   Reduction of carbohydrate intake to no more than 60 grams per meal and 15 grams for snacks

## 2022-02-16 NOTE — PROGRESS NOTES
I have reviewed and concur with Dr. Devante Cerda's history, physical, assessment, and plan.  I have personally interviewed and examined the patient.  See below addendum for my evaluation and additional findings.    Having significant fluctuations in blood glucose, mostly hyperglycemia at night related to high carbohydrate intake and snacking.  Difficult to make any adjustments to insulin pump settings today so we have instructed her to try and limit carbohydrate intake to 60 g per meal and 15 g for snacks.  Dexcom prescription sent to pharmacy (she will let us know if not covered so that we can send it through medical benefits).  She will contact [employer] (she reports that she gets supplies directly from them) to update physician so that I can renew her pump supplies.  Will also arrange for comprehensive Diabetes Education.    Katy Escalante MD

## 2022-02-16 NOTE — PROGRESS NOTES
"Pump Clinic Return Visit    Chief complaint: Post Hospital discharge follow up       HPI:Debra Duran is a 27 y.o. female who was diagnosed with type 1 DM in 2008 (at the age of 13).     Patient new to pump clinic. She moved from [place] 1[place]5 years ago and has been receiving refills from previous endocrinologist and will like to establish care with us.    Recently admitted to [facility] on 2/6/2022 due to DKA. Pt was feeling lightheaded, N/V and abdominal discomfort. Attempted to hydrate with regular sprite and water and did not keep track of her BG leading to DKA.     Current diabetes regimen:  Pump: Medtronic pump 670G (since 2009)  Has been on current pump since 2009    Pump Settings  Basal Rate  12A:0.925 U/hr  7AM: 1 U/hr    Carb Ratio  12A: 1:8    ISF  12A: 1:50    Target: 120  IAT: 4    TDD 52.5  Basal 44%; Bolus 56%  Sensor wear 0% of the time (Manual mode 100%)  [email or web address]/[email or web address]    Lab Results   Component Value Date    HGBA1C 12.0 (H) 02/06/2022       Glucose Monitoring:  Meter/CGM: Using Dexcom G6  Sensor and pump report downloaded and reviewed, see report in media tab    Pt is monitoring blood glucose readings 4 times a day.  Needs >100 strips per month related to fluctuations with blood glucose reading, A1c trends, and activity level.    Hypoglycemic Episodes:  No severe hypoglycemic episodes requiring observer intervention    DKA: 2/6/2022    Screening / DM Complications:  Neuropathy: No  Last foot exam: 02/16/2022  Last eye exam : 10/08/2021;  no laser surgery or DR  CVD/MI: No    No results found for: TSH  No results found for: TTGIGA    Diet/Exercise:  3 meals a day Heavy in carbs    Pregnancy plans: No    Review of Systems  7 point review of systems negative except as stated above    Vital Signs  BP (!) 138/98 (BP Location: Left arm, Patient Position: Sitting, BP Method: Medium (Manual))   Pulse 87   Ht 5' 3" (1.6 m)   Wt 75.4 kg (166 lb 5.4 oz)   SpO2 99%   BMI " 29.47 kg/m²     Physical Exam  Cardiovascular:      Heart sounds: Normal heart sounds.   Pulmonary:      Breath sounds: Normal breath sounds.   Abdominal:      Palpations: Abdomen is soft.   Musculoskeletal:      Cervical back: Neck supple.           Diabetes Foot Exam:   Protective Sensation (w/ 10 gram monofilament):  Right: Intact  Left: Intact    Visual Inspection:  Normal -  Bilateral    Pedal Pulses:   Right: Present  Left: Present    Posterior tibialis:   Right:Present  Left: Present          Chemistry        Component Value Date/Time     02/08/2022 0241    K 3.5 02/08/2022 0241     02/08/2022 0241    CO2 23 02/08/2022 0241    BUN 6 02/08/2022 0241    CREATININE 0.8 02/08/2022 0241     (H) 02/08/2022 0241        Component Value Date/Time    CALCIUM 9.5 02/08/2022 0241    ALKPHOS 92 02/08/2022 0241    AST 13 02/08/2022 0241    ALT 12 02/08/2022 0241    BILITOT 1.5 (H) 02/08/2022 0241    ESTGFRAFRICA >60.0 02/08/2022 0241    EGFRNONAA >60.0 02/08/2022 0241             Diabetes Management Status    Statin: Not taking  ACE/ARB: Not taking    Screening or Prevention Patient's value Goal Complete/Controlled?   HgA1C Testing and Control   Lab Results   Component Value Date    HGBA1C 12.0 (H) 02/06/2022      Annually/Less than 8% No   Lipid profile Most Recent Lipid Panel Health Maintenance Topic Completion: Not Found Annually No   LDL control No results found for: LDLCALC Annually/Less than 100 mg/dl  No   Nephropathy screening No results found for: LABMICR  Lab Results   Component Value Date    PROTEINUA 2+ (A) 02/06/2022     No results found for: UTPCR   Annually Yes   Blood pressure BP Readings from Last 1 Encounters:   02/16/22 (!) 138/98    Less than 140/90 Yes   Dilated retinal exam : 10/08/2021 Annually Yes   Foot exam   : 02/16/2022 Annually No     Assessment/Plan  DM type 1, not at goal  -  Last A1c 12  -  Foot exam performed w/i 1 year     No abnormalities  -  Optometry seen w/i 1 year                No retinopathy  -  NANCY ordered uptodate                     No ARB/ACEi required  -  Lipid panel ordered                 PLAN:   -  Due to 72hr CGM and pump review w/ evidence of: Late night hyperglycemia due to high carb intake during dinner time in addition to not blousing for extra carbs taken post dinner. Noted to have some lows due to insulin stacking. Patient instructed to wait 3 hours before giving another correction. Noted to over correct for her low with ½ a regular coke leading to glycemic excursions. Pt educated on rule of 15 (to drink 4 ounces of orange juice, eat 15 grams of carbs or take 4 sugar pills -each has 4 grams- and wait 15 minutes and keep correcting until BG wnl)    Pt is to continue with current pump, will not make any changes. She is to decrease her carb intake to no more than 60 grams per meal and 15 grams for an snacks.     Dexcom prescription sent to [facility]     -  Diabetic supplies and medications: reviewed  -  I personally reviewed CGM/PUMP data       Insulin pump in place  Continue with current insulin pump: Medtronic pump 670G   Remember to change your site every 3 days      Overweight (BMI 25.0-29.9)  Diet and exercise recommended   Reduction of carbohydrate intake to no more than 60 grams per meal and 15 grams for snacks        Glucagon kit: yes (prescription sent 02/16/2022)    FOLLOW UP  Follow up in about 6 weeks (around 3/30/2022).       Pump backup plan  If the insulin pump is non functional and discontinued for anticipated more than 20 hours, please give daily injections of:  Long acting insulin 20 units daily  Short acting insulin for meals according to carb ratios and sensitivity factor in the pump.    When the insulin pump is restarted, do not restart basal rates until at least 22 hours after the last long acting insulin injection. You can set a 0% temporary basal setting that will last until this time and use your pump to bolus for meals and  correction.    For any technical insulin pump issues, please contact the insulin pump company; the toll free number is printed on the label on the back of the insulin pump.

## 2022-02-16 NOTE — ASSESSMENT & PLAN NOTE
Continue with current insulin pump: Medtronic pump 670G   Remember to change your site every 3 days

## 2022-02-16 NOTE — ASSESSMENT & PLAN NOTE
-  Last A1c 12  -  Foot exam performed w/i 1 year     No abnormalities  -  Optometry seen w/i 1 year               No retinopathy  -  NANCY ordered uptodate                     No ARB/ACEi required  -  Lipid panel ordered                 PLAN:   -  Due to 72hr CGM and pump review w/ evidence of: Late night hyperglycemia due to high carb intake during dinner time in addition to not blousing for extra carbs taken post dinner. Noted to have some lows due to insulin stacking. Patient instructed to wait 3 hours before giving another correction. Noted to over correct for her low with ½ a regular coke leading to glycemic excursions. Pt educated on rule of 15 (to drink 4 ounces of orange juice, eat 15 grams of carbs or take 4 sugar pills -each has 4 grams- and wait 15 minutes and keep correcting until BG wnl)    Pt is to continue with current pump, will not make any changes. She is to decrease her carb intake to no more than 60 grams per meal and 15 grams for an snacks.     Dexcom prescription sent to [facility]     -  Diabetic supplies and medications: reviewed  -  I personally reviewed CGM/PUMP data

## 2022-02-16 NOTE — PATIENT INSTRUCTIONS
Return to clinic in 6 weeks  Continue with current insulin pump setting     Recommend to decrease the number or carbs per meal to a max of 60 grams per meal and 15 grams for snacks    Glucagon spray sent

## 2022-02-23 ENCOUNTER — PATIENT MESSAGE (OUTPATIENT)
Dept: DIABETES | Facility: CLINIC | Age: 27
End: 2022-02-23
Payer: COMMERCIAL

## 2022-02-28 ENCOUNTER — CLINICAL SUPPORT (OUTPATIENT)
Dept: DIABETES | Facility: CLINIC | Age: 27
End: 2022-02-28
Payer: COMMERCIAL

## 2022-02-28 VITALS — BODY MASS INDEX: 29.1 KG/M2 | WEIGHT: 164.25 LBS | HEIGHT: 63 IN

## 2022-02-28 DIAGNOSIS — E10.9: ICD-10-CM

## 2022-02-28 PROCEDURE — G0108 DIAB MANAGE TRN  PER INDIV: HCPCS | Mod: S$GLB,,, | Performed by: FAMILY MEDICINE

## 2022-02-28 PROCEDURE — G0108 PR DIAB MANAGE TRN  PER INDIV: ICD-10-PCS | Mod: S$GLB,,, | Performed by: FAMILY MEDICINE

## 2022-02-28 PROCEDURE — 99999 PR PBB SHADOW E&M-EST. PATIENT-LVL III: ICD-10-PCS | Mod: PBBFAC,,,

## 2022-02-28 PROCEDURE — 99999 PR PBB SHADOW E&M-EST. PATIENT-LVL III: CPT | Mod: PBBFAC,,,

## 2022-02-28 NOTE — PROGRESS NOTES
Diabetes Care Specialist Progress Note  Author: Renetta Alatorre RN, CDE  Date: 2/28/2022    Program Intake  Reason for Diabetes Program Visit:: Initial Diabetes Assessment  Current diabetes risk level:: high  In the last 12 months, have you:: been admitted to a hospital  Was the ER or hospital admission related to diabetes?: Yes    Lab Results   Component Value Date    HGBA1C 12.0 (H) 02/06/2022     Clinical     Problem Review  Reviewed Problem List with Patient: yes  Active comorbidities affecting diabetes self-care.: no  Reviewed health maintenance: yes    Clinical Assessment  Current Diabetes Treatment: Insulin    Nutritional Status  Meal Plan 24 Hour Recall: Breakfast, Lunch, Dinner, Snack  Meal Plan 24 Hour Recall - Breakfast: skipped  Meal Plan 24 Hour Recall - Lunch: skipped  Meal Plan 24 Hour Recall - Dinner: [employer]: Big Mac, [place] - diet coke  Meal Plan 24 Hour Recall - Snack: no snacks  Change in appetite?: No  Dentation:: Intact  Recent Changes in Weight: Weight Loss  Was weight loss intentional or unintentional?: Unintentional  Current nutritional status an area of need that is impacting patient's ability to self-manage diabetes?: No    Additional Social History    Support  Does anyone support you with your diabetes care?: yes  Who supports you?: spouse  Who takes you to your medical appointments?: self  Does the current support meet the patient's needs?: Yes  Is Support an area impacting ability to self-manage diabetes?: No    Access to Mass Media & Technology  Does the patient have access to any of the following devices or technologies?: Smart phone, Internet Access  Media or technology needs impacting ability to self-manage diabetes?: No    Cognitive/Behavioral Health  Alert and Oriented: Yes  Difficulty Thinking: No  Requires Prompting: No  Requires assistance for routine expression?: No  Cognitive or behavioral barriers impacting ability to self-manage diabetes?: No    Culture/Orthodox  Culture or  Anabaptist beliefs that may impact ability to access healthcare: No    Communication  Language preference: English  Hearing Problems: No  Vision Problems: No  Communication needs impacting ability to self-manage diabetes?: No    Health Literacy  Preferred Learning Method: Face to Face  How often do you need to have someone help you read instructions, pamphlets, or written material from your doctor or pharmacy?: Rarely  Health literacy needs impacting ability to self-manage diabetes?: No    Diabetes Self-Management Skills Assessment    Diabetes Disease Process/Treatment Options  Patient/caregiver able to state what happens when someone has diabetes.: yes  Patient/caregiver knows what type of diabetes they have.: yes  Diabetes Type : Type I  Patient/caregiver able to identify at least three signs and symptoms of diabetes.: yes  Identified signs and symptoms:: frequent urination, increased thirst  Diabetes Disease Process/Treatment Options: Skills Assessment Completed: Yes  Assessment indicates:: Adequate understanding  Area of need?: No    Nutrition/Healthy Eating  Challenges to healthy eating:: other (see comments) (skips meals frequently)  Method of carbohydrate measurement:: carb counting/reading labels  Patient can identify foods that impact blood sugar.: yes  Patient-identified foods:: starches (bread, pasta, rice, cereal), fruit/fruit juice, milk, soda, starchy vegetables (corn, peas, beans), sweets, yogurt  Nutrition/Healthy Eating Skills Assessment Completed:: Yes  Assessment indicates:: Adequate understanding, Instruction Needed  Area of need?: Yes    Physical Activity/Exercise  Physical Activity/Exercise Skills Assessment Completed: : No  Deffered due to:: Time    Medications  Patient is able to describe current diabetes management routine.: yes  Diabetes management routine:: insulin pump, diet  Patient is able to identify current diabetes medications, dosages, and appropriate timing of medications.:  yes  Patient understands the purpose of the medications taken for diabetes.: yes  Patient reports problems or concerns with current medication regimen.: yes  Medication regimen problems/concerns:: other (see comments) (needs CGM)  Medication Skills Assessment Completed:: Yes  Assessment indicates:: Adequate understanding, Instruction Needed  Area of need?: Yes    Home Blood Glucose Monitoring  Patient states that blood sugar is checked at home daily.: yes  Monitoring Method:: home glucometer  How often do you check your blood sugar?: Twice a day  Blood glucose logs:: no  Blood glucose logs reviewed today?: no  Home Blood Glucose Monitoring Skills Assessment Completed: : Yes  Assessment indicates:: Knowledge deficit, Instruction Needed  Area of need?: Yes    Acute Complications  Patient is able to identify types of acute complications: Yes  Patient Identified:: Hypoglycemia, Diabetic Ketoacidosis, Hyperglycemia  Patient is able to state the basic meaning of hypoglycemia?: Yes  Able to state the blood sugar range for hypoglycemia?: yes  Patient stated range:: <70  Patient can identify general symptoms of hypoglycemia: yes  Patient identified:: dizziness, hunger, fatigue, shakiness  Able to state proper treatment of hypoglycemia?: yes  Patient identified:: 1/2 can soda/fruit juice  Patient is able to state the basic meaning of hyperglycemia?: Yes  Able to state the blood sugar range for hyperglycemia?: yes  Patient stated range:: >250  Patient able to state proper treatment of hyperglycemia?: yes  Patient identified:: increase water intake, monitor blood sugar, take medication as recommended  Acute Complications Skills Assessment Completed: : Yes  Assessment indicates:: Adequate understanding  Area of need?: No    Chronic Complications  Chronic Complications Skills Assessment Completed: : No  Deferred due to:: Time    Psychosocial/Coping  Psychosocial/Coping Skills Assessment Completed: : No  Deffered due to::  "Time    Diabetes Self Support Plan     Assessment Summary and Plan    Based on today's diabetes care assessment, the following areas of need were identified:      Social 2/28/2022   Support No   Access to Mass Media/Tech No   Cognitive/Behavioral Health No   Culture/Yazdanism No   Communication No   Health Literacy No      Clinical 2/28/2022   Nutritional Status No      Diabetes Self-Management Skills 2/28/2022   Diabetes Disease Process/Treatment Options No   Nutrition/Healthy Eating Yes: see care planning    Medication Yes: see care planning    Home Blood Glucose Monitoring Yes: see care planning    Acute Complications No      Today's interventions were provided through individual discussion, instruction, and written materials were provided.      Patient verbalized understanding of instruction and written materials.  Pt was able to return back demonstration of instructions today. Patient understood key points, needs reinforcement and further instruction.     Diabetes Self-Management Care Plan:    Today's Diabetes Self-Management Care Plan was developed with Debra's input. Debra has agreed to work toward the following goal(s) to improve his/her overall diabetes control.      Care Plan: Diabetes Management   Updates made since 1/29/2022 12:00 AM      Problem: Blood Glucose Self-Monitoring       Long-Range Goal: Pt will use Dexcom sensor to monitor BG continuously.    Start Date: 2/28/2022   Expected End Date: 5/29/2022   Priority: High   Note:    DEXCOM G6 SENSOR START     Patient referred to clinic today for Dexcom G6 continuous glucose sensor system training.  Education provided using "Quick Start Guide" per Dexcom protocol.    · Cammie/ Overview:  5 min glucose reading updates, trending arrows, BG graph screens, battery life indicator, Blue Tooth Symbol.  · Cammie/ Menus: trend Graph, start sensor, enter BG, events, Alerts, Settings, Shutdown, Stop Sensor.   · Cammie/ Screens and " prompts  · Alarm Settings:    * Low alert: 75    * High alert: 300    · Transmitter and Sensor Codes entered per prompts    · Reviewed sensor site selection. Site selected and prepped using aseptic technique. Sensor inserted. Transmitter placed in pod and secured.  · Practiced sensor pod/transmitter removal from site, and removal of transmitter from sensor pod.  · Patient able to demonstrate without difficulty.  Encouraged to review manual or jovani video prior to starting another sensor.   · Reviewed problem-solving aspects of sensor transmission/ variables that can disrupt transmission. /jovani range 20 feet, but the first 3 hrs keep within 3 feet of transmitter.  · Pt instructed on lag time of interstitial fluid from CBG and was advised to tx hypoglycemia and dose insulin based on SMBG values.  · Dexcom technical support contact number given and examples of when to contact them discussed.   · Clarity jovani and website reviewed w/ pt. Assisted pt w/ connecting to Clarity account for automatic uploads.        Task: Provided patient with a Dexcom sample today. Completed 2/28/2022   Note:    Following up with [facility] about obtaining script.      Task: Reviewed the importance of self-monitoring blood glucose and keeping logs. Completed 2/28/2022      Task: Provided patient with blood glucose logs, reviewed appropriate timing and frequency to SMBG, education on parameters on when to notify provider and advised patient to bring logs to all appts with PCP/Endocrinologist/Diabetes Care Specialist. Completed 2/28/2022      Problem: Healthy Eating       Goal: Pt will stock convenient breakfast (ie protein shake/fruit) to eat on the go on  work days.    Start Date: 2/28/2022   Expected End Date: 3/30/2022   Priority: High      Task: Reviewed the sources and role of Carbohydrate, Protein, and Fat and how each nutrient impacts blood sugar. Completed 2/28/2022      Task: Provided visual examples using dry measuring cups,  food models, and other familiar objects such as computer mouse, deck or cards, tennis ball etc. to help with visualization of portions. Completed 2/28/2022      Task: Explained how to count carbohydrates using the food label and the use of dry measuring cups for accurate carb counting. Completed 2/28/2022      Task: Discussed strategies for choosing healthier menu options when dining out. Completed 2/28/2022      Task: Recommended replacing beverages containing high sugar content with noncaloric/sugar free options and/or water. Completed 2/28/2022      Task: Review the importance of balancing carbohydrates with each meal using portion control techniques to count servings of carbohydrate and label reading to identify serving size and amount of total carbs per serving. Completed 2/28/2022      Task: Provided Sample plate method and reviewed the use of the plate to estimate amounts of carbohydrate per meal. Completed 2/28/2022      Problem: Medications       Long-Range Goal: Pt will continue to bolus before each meal using MMM.    Start Date: 2/28/2022   Expected End Date: 5/29/2022   Priority: High      Task: Discussed guidelines for preventing, detecting and treating hypoglycemia and hyperglycemia and reviewed the importance of meal and medication timing with diabetes mediations for prevention of hypoglycemia and maximum drug benefit. Completed 2/28/2022        Follow Up Plan     Follow up if symptoms worsen or fail to improve.    Today's care plan and follow up schedule was discussed with patient.  Debra verbalized understanding of the care plan, goals, and agrees to follow up plan.        The patient was encouraged to communicate with his/her health care provider/physician and care team regarding his/her condition(s) and treatment.  I provided the patient with my contact information today and encouraged to contact me via phone or [facility] as needed.     Length of Visit   Total Time: 60 Minutes

## 2022-03-02 ENCOUNTER — PATIENT MESSAGE (OUTPATIENT)
Dept: DIABETES | Facility: CLINIC | Age: 27
End: 2022-03-02
Payer: COMMERCIAL

## 2022-03-02 DIAGNOSIS — Z00.00 ANNUAL PHYSICAL EXAM: ICD-10-CM

## 2022-03-02 RX ORDER — INSULIN ASPART 100 [IU]/ML
INJECTION, SOLUTION INTRAVENOUS; SUBCUTANEOUS
Qty: 40 ML | Refills: 11 | Status: CANCELLED | OUTPATIENT
Start: 2022-03-02

## 2022-03-03 NOTE — TELEPHONE ENCOUNTER
No new care gaps identified.  Powered by [employer] - Biomolecular Determination. Reference number: [id number].   3/02/2022 9:39:10 PM CST

## 2022-03-04 DIAGNOSIS — Z00.00 ANNUAL PHYSICAL EXAM: ICD-10-CM

## 2022-03-04 RX ORDER — INSULIN ASPART 100 [IU]/ML
INJECTION, SOLUTION INTRAVENOUS; SUBCUTANEOUS
Qty: 40 ML | Refills: 11 | Status: SHIPPED | OUTPATIENT
Start: 2022-03-04

## 2022-03-05 ENCOUNTER — PATIENT MESSAGE (OUTPATIENT)
Dept: ENDOCRINOLOGY | Facility: CLINIC | Age: 27
End: 2022-03-05
Payer: COMMERCIAL

## 2022-03-14 ENCOUNTER — TELEPHONE (OUTPATIENT)
Dept: PHARMACY | Facility: CLINIC | Age: 27
End: 2022-03-14
Payer: COMMERCIAL

## 2022-03-14 NOTE — TELEPHONE ENCOUNTER
DOCUMENTATION ONLY  Sensor  Approval date: 3/14/2022 to 3/13/2023   Case ID# [id number]      (1 meter per 12 months with 1 refill)  Approval date: 3/14/2022 to 3/13/2023   Case ID# [id number]     Transmitter (1 transmitter per 90 days with 4 fills)  Approval date: 3/14/2022 to 3/13/2023   Case ID# [id number] [facility]

## 2022-05-31 ENCOUNTER — PATIENT MESSAGE (OUTPATIENT)
Dept: ADMINISTRATIVE | Facility: HOSPITAL | Age: 27
End: 2022-05-31
Payer: COMMERCIAL

## 2024-02-06 ENCOUNTER — PATIENT MESSAGE (OUTPATIENT)
Dept: ADMINISTRATIVE | Facility: HOSPITAL | Age: 29
End: 2024-02-06
Payer: COMMERCIAL

## 2024-02-06 ENCOUNTER — PATIENT OUTREACH (OUTPATIENT)
Dept: ADMINISTRATIVE | Facility: HOSPITAL | Age: 29
End: 2024-02-06
Payer: COMMERCIAL

## 2024-02-06 NOTE — PROGRESS NOTES
Updates were requested from care everywhere.  Health Maintenance has been updated.  LINKS immunization registry triggered.  Immunizations were reconciled.  Telephone outreach to update PCP. LM, portal message sent.

## 2024-12-12 NOTE — ED PROVIDER NOTES
Patient Seen in: [facility]      History     Chief Complaint   Patient presents with    Foot Injury     Stated Complaint: right little toe injury    Subjective:   29-year-old female presents to the immediate care with complaint of toe injury.  Patient states she was walking down the steps of her garage and socks.  She tripped and ended up stepping her left fifth toe.  She has had some pain and swelling to her toe.  She denies taking anything for it as she is 7 weeks pregnant.  She has been icing it and elevating it however.  She denies any other injuries, numbness, or tingling.    The history is provided by the patient.         Objective:     Past Medical History:    Type 1 diabetes mellitus (HCC)              Past Surgical History:   Procedure Laterality Date    Appendectomy                  No pertinent social history.            Review of Systems   Constitutional: Negative.    Musculoskeletal:  Positive for arthralgias and joint swelling.   All other systems reviewed and are negative.      Positive for stated complaint: right little toe injury  Other systems are as noted in HPI.  Constitutional and vital signs reviewed.      All other systems reviewed and negative except as noted above.    Physical Exam     ED Triage Vitals [12/12/24 1629]   /80   Pulse 81   Resp 16   Temp 98.4 °F (36.9 °C)   Temp src Oral   SpO2 100 %   O2 Device None (Room air)       Current Vitals:   Vital Signs  BP: 142/80  Pulse: 81  Resp: 16  Temp: 98.4 °F (36.9 °C)  Temp src: Oral    Oxygen Therapy  SpO2: 100 %  O2 Device: None (Room air)        Physical Exam  Vitals and nursing note reviewed.   Constitutional:       General: She is not in acute distress.     Appearance: Normal appearance. She is normal weight. She is not ill-appearing.   HENT:      Head: Normocephalic and atraumatic.      Mouth/Throat:      Mouth: Mucous membranes are moist.      Pharynx: Oropharynx is clear.   Eyes:      Conjunctiva/sclera:  Conjunctivae normal.   Cardiovascular:      Rate and Rhythm: Normal rate and regular rhythm.      Pulses: Normal pulses.      Heart sounds: Normal heart sounds.   Pulmonary:      Effort: Pulmonary effort is normal. No respiratory distress.      Breath sounds: Normal breath sounds.   Musculoskeletal:         General: Swelling, tenderness and signs of injury present. No deformity.   Feet:      Comments: Tenderness with palpation of the right fifth digit.  There is edema and ecchymosis to the toe.  No pain with palpation of the MTP joint or metatarsal.  No obvious deformity or dislocation.  ROM, undersign, and sensation intact.  Cap refill less than 2 seconds and DP is 2+.  Skin:     General: Skin is warm and dry.      Capillary Refill: Capillary refill takes less than 2 seconds.   Neurological:      General: No focal deficit present.      Mental Status: She is alert and oriented to person, place, and time.   Psychiatric:         Mood and Affect: Mood normal.         Behavior: Behavior normal.           ED Course   Labs Reviewed - No data to display       MDM        Medical Decision Making  30 yo female with right 5th toe injury.  Discussed with patient risks and benefits of an X-ray.  Patient wants to get the imaging.  Patient to be shielded.  X-ray ordered.     X-ray as read by radiology shows an oblique fracture to the base of the left fifth distal phalanx.  Will mir tape toes and place patient in a postop shoe to help with swelling.  Discussed with patient use of ice and Tylenol to help with pain control.  Referral given for podiatry for follow-up.  No evidence of neurovascular compromise.    Amount and/or Complexity of Data Reviewed  Radiology: ordered. Decision-making details documented in ED Course.    Risk  OTC drugs.        Disposition and Plan     Clinical Impression:  1. Closed nondisplaced fracture of distal phalanx of lesser toe of right foot, initial encounter         Disposition:  Discharge  12/12/2024   5:43 pm    Follow-up:  Otis Tolbert DPM  [address] [place] [address]  [phone]    In 1 week  As needed          Medications Prescribed:  Current Discharge Medication List              Supplementary Documentation:

## 2024-12-12 NOTE — ED INITIAL ASSESSMENT (HPI)
OB patient here with c/o right fifth toe injury and swelling. States was walking in her garage with just socks and tripped.

## 2024-12-12 NOTE — DISCHARGE INSTRUCTIONS
Rest, ice and elevate as much as possible over the next few days.   Keep the toes buddy taped and wear the post op shoe for the next few weeks.  Take Tylenol as needed for pain control.   Follow up with Dr. Tolbert for podiatry in the next 1 week.     Thank you for choosing [facility] for your care.

## (undated) NOTE — ED AVS SNAPSHOT
THE [facility] in [place] [address] [facility] [address] [address]    Phone:  [phone]    Fax:  [phone] [address] [address]   MRN: [id number]    Department:  THE [facility] in [place]   Date of Visit:  2/12/2017 Insurance plans vary and the physician(s) referred by the Immediate Care may not be covered by your plan. Please contact your insurance company to determine coverage for follow-up care and referrals. [facility]  [address] If you have been prescribed any medication(s), please fill your prescription right away and begin taking the medication(s) as directed.     If the Immediate Care Provider has read X-rays, these will be re-interpreted by a radiologist.  If there is a signifi can help with your Affordable Care Act coverage, as well as all types of Medicaid plans. To get signed up and covered, please call [phone] and ask to get set up for an insurance coverage that is in-network with [facility]

## (undated) NOTE — MR AVS SNAPSHOT
[address] [facility] [id number] [phone]               Thank you for choosing us for your health care visit with [facility].   We are glad to serve you and happy to provide you with this If you have questions, you can call [phone] to talk to our [facility] Staff. Remember, [facility] is NOT to be used for urgent needs. For medical emergencies, dial 911. Visit [email or web address] to learn more.         Educational Infor

## (undated) NOTE — LETTER
[address] [facility] [place] Parra [address]  [phone]            9/[birth year]2018        [address] [place] [address] [place]entes [id number]          Dear Patient,     According to